# Patient Record
Sex: FEMALE | Race: WHITE | Employment: FULL TIME | URBAN - METROPOLITAN AREA
[De-identification: names, ages, dates, MRNs, and addresses within clinical notes are randomized per-mention and may not be internally consistent; named-entity substitution may affect disease eponyms.]

---

## 2019-07-17 ENCOUNTER — TELEPHONE (OUTPATIENT)
Dept: INTERNAL MEDICINE CLINIC | Age: 44
End: 2019-07-17

## 2019-07-17 ENCOUNTER — OFFICE VISIT (OUTPATIENT)
Dept: INTERNAL MEDICINE CLINIC | Age: 44
End: 2019-07-17
Payer: COMMERCIAL

## 2019-07-17 VITALS
DIASTOLIC BLOOD PRESSURE: 60 MMHG | TEMPERATURE: 98.5 F | OXYGEN SATURATION: 97 % | RESPIRATION RATE: 14 BRPM | HEART RATE: 57 BPM | WEIGHT: 156.4 LBS | BODY MASS INDEX: 27.71 KG/M2 | HEIGHT: 63 IN | SYSTOLIC BLOOD PRESSURE: 100 MMHG

## 2019-07-17 DIAGNOSIS — F32.81 PREMENSTRUAL DYSPHORIC DISORDER: ICD-10-CM

## 2019-07-17 DIAGNOSIS — Z00.00 ANNUAL PHYSICAL EXAM: Primary | ICD-10-CM

## 2019-07-17 DIAGNOSIS — G25.81 RESTLESS LEGS SYNDROME: ICD-10-CM

## 2019-07-17 LAB
A/G RATIO: 2.3 (ref 1.1–2.2)
ALBUMIN SERPL-MCNC: 4.5 G/DL (ref 3.4–5)
ALP BLD-CCNC: 36 U/L (ref 40–129)
ALT SERPL-CCNC: 8 U/L (ref 10–40)
ANION GAP SERPL CALCULATED.3IONS-SCNC: 16 MMOL/L (ref 3–16)
AST SERPL-CCNC: 16 U/L (ref 15–37)
BASOPHILS ABSOLUTE: 0 K/UL (ref 0–0.2)
BASOPHILS RELATIVE PERCENT: 0.7 %
BILIRUB SERPL-MCNC: 0.6 MG/DL (ref 0–1)
BUN BLDV-MCNC: 10 MG/DL (ref 7–20)
CALCIUM SERPL-MCNC: 9.3 MG/DL (ref 8.3–10.6)
CHLORIDE BLD-SCNC: 102 MMOL/L (ref 99–110)
CHOLESTEROL, TOTAL: 210 MG/DL (ref 0–199)
CO2: 22 MMOL/L (ref 21–32)
CREAT SERPL-MCNC: 0.7 MG/DL (ref 0.6–1.1)
EOSINOPHILS ABSOLUTE: 0.2 K/UL (ref 0–0.6)
EOSINOPHILS RELATIVE PERCENT: 3.5 %
GFR AFRICAN AMERICAN: >60
GFR NON-AFRICAN AMERICAN: >60
GLOBULIN: 2 G/DL
GLUCOSE BLD-MCNC: 90 MG/DL (ref 70–99)
HCT VFR BLD CALC: 40.8 % (ref 36–48)
HDLC SERPL-MCNC: 72 MG/DL (ref 40–60)
HEMOGLOBIN: 13.7 G/DL (ref 12–16)
IRON: 118 UG/DL (ref 37–145)
LDL CHOLESTEROL CALCULATED: 122 MG/DL
LYMPHOCYTES ABSOLUTE: 2.4 K/UL (ref 1–5.1)
LYMPHOCYTES RELATIVE PERCENT: 36.5 %
MCH RBC QN AUTO: 29.9 PG (ref 26–34)
MCHC RBC AUTO-ENTMCNC: 33.5 G/DL (ref 31–36)
MCV RBC AUTO: 89.2 FL (ref 80–100)
MONOCYTES ABSOLUTE: 0.4 K/UL (ref 0–1.3)
MONOCYTES RELATIVE PERCENT: 6 %
NEUTROPHILS ABSOLUTE: 3.4 K/UL (ref 1.7–7.7)
NEUTROPHILS RELATIVE PERCENT: 53.3 %
PDW BLD-RTO: 13.6 % (ref 12.4–15.4)
PLATELET # BLD: 272 K/UL (ref 135–450)
PMV BLD AUTO: 10.3 FL (ref 5–10.5)
POTASSIUM SERPL-SCNC: 4.5 MMOL/L (ref 3.5–5.1)
RBC # BLD: 4.57 M/UL (ref 4–5.2)
SODIUM BLD-SCNC: 140 MMOL/L (ref 136–145)
TOTAL PROTEIN: 6.5 G/DL (ref 6.4–8.2)
TRIGL SERPL-MCNC: 81 MG/DL (ref 0–150)
TSH SERPL DL<=0.05 MIU/L-ACNC: 8.93 UIU/ML (ref 0.27–4.2)
VLDLC SERPL CALC-MCNC: 16 MG/DL
WBC # BLD: 6.5 K/UL (ref 4–11)

## 2019-07-17 PROCEDURE — 99386 PREV VISIT NEW AGE 40-64: CPT | Performed by: INTERNAL MEDICINE

## 2019-07-17 PROCEDURE — 36415 COLL VENOUS BLD VENIPUNCTURE: CPT | Performed by: INTERNAL MEDICINE

## 2019-07-17 RX ORDER — FLUOXETINE 10 MG/1
10 CAPSULE ORAL DAILY
Qty: 30 CAPSULE | Refills: 1 | Status: SHIPPED | OUTPATIENT
Start: 2019-07-17 | End: 2019-10-10

## 2019-07-17 RX ORDER — ROPINIROLE 0.25 MG/1
0.25 TABLET, FILM COATED ORAL NIGHTLY
Qty: 30 TABLET | Refills: 1 | Status: SHIPPED | OUTPATIENT
Start: 2019-07-17 | End: 2019-10-10

## 2019-07-17 SDOH — HEALTH STABILITY: MENTAL HEALTH: HOW OFTEN DO YOU HAVE A DRINK CONTAINING ALCOHOL?: NEVER

## 2019-07-17 ASSESSMENT — ENCOUNTER SYMPTOMS
BACK PAIN: 0
RECTAL PAIN: 0
ANAL BLEEDING: 0
FACIAL SWELLING: 0
ABDOMINAL PAIN: 0
EYE ITCHING: 0
PHOTOPHOBIA: 0
COUGH: 0
SHORTNESS OF BREATH: 0
WHEEZING: 0
EYE DISCHARGE: 0
VOICE CHANGE: 0
RHINORRHEA: 0
TROUBLE SWALLOWING: 0
EYE PAIN: 0
APNEA: 0
EYE REDNESS: 0
BLOOD IN STOOL: 0
NAUSEA: 0
CHEST TIGHTNESS: 0
ABDOMINAL DISTENTION: 0
DIARRHEA: 0
VOMITING: 0
SORE THROAT: 0
CHOKING: 0
SINUS PRESSURE: 0
CONSTIPATION: 0

## 2019-07-17 ASSESSMENT — PATIENT HEALTH QUESTIONNAIRE - PHQ9
2. FEELING DOWN, DEPRESSED OR HOPELESS: 0
SUM OF ALL RESPONSES TO PHQ9 QUESTIONS 1 & 2: 0
SUM OF ALL RESPONSES TO PHQ QUESTIONS 1-9: 0
1. LITTLE INTEREST OR PLEASURE IN DOING THINGS: 0
SUM OF ALL RESPONSES TO PHQ QUESTIONS 1-9: 0

## 2019-07-17 NOTE — PATIENT INSTRUCTIONS
can you care for yourself at home? · Take your medicines exactly as prescribed. Call your doctor if you think you are having a problem with your medicine. · Take anti-inflammatory medicines if your body aches or your breasts are sore. These include ibuprofen (Advil, Motrin) and naproxen (Aleve). Read and follow all instructions on the label. · Get regular daily exercise to help improve your mood. Walks are a good choice. · Some foods and drinks can make symptoms worse. Try to drink less caffeine or alcohol while you have PMDD or several days before you expect to have symptoms. You may also want to eat less salt then too. · Manage your stress. Try to meditate, do guided imagery, or practice breathing exercises. When should you call for help? [id number] anytime you think you may need emergency care. For example, call if:    · You feel you cannot stop from hurting yourself or someone else.   [facility] your doctor now or seek immediate medical care if:    · You have severe vaginal bleeding.     · You have new or worse belly or pelvic pain.    Watch closely for changes in your health, and be sure to contact your doctor if:    · You have unusual vaginal bleeding.     · You do not get better as expected. Where can you learn more? Go to https://[email or web address].ArcMail. org and sign in to your [employer]. account. Enter P101 in the [employer] box to learn more about \"Premenstrual Dysphoric Disorder (PMDD): Care Instructions. \"     If you do not have an account, please click on the \"Sign Up Now\" link. Current as of: May 14, 2018  Content Version: 12.0  © 4868-5781 [employer]. Care instructions adapted under license by [facility] ([facility]). If you have questions about a medical condition or this instruction, always ask your healthcare professional. [place] [address] any warranty or liability for your use of this information.          Patient Education        Restless Legs Syndrome: Care

## 2019-07-17 NOTE — TELEPHONE ENCOUNTER
Pt called and wonders if Dr. Jeremías Estes will be able to test her Level of : Vitamin D, B, zinc and ferritin  Pt has no problem coming back for another withdraw.    Pt phone # [phone]

## 2019-07-17 NOTE — PROGRESS NOTES
sweats, mood swings,    Musculoskeletal: Negative for arthralgias, back pain, gait problem, joint swelling, myalgias, neck pain and neck stiffness. Skin: Negative for rash. Neurological: Negative for dizziness, tremors, seizures, syncope, facial asymmetry, speech difficulty, weakness, light-headedness, numbness and headaches. Hematological: Negative for adenopathy. Does not bruise/bleed easily. Psychiatric/Behavioral: Negative for decreased concentration, dysphoric mood and sleep disturbance. The patient is not nervous/anxious. All other systems reviewed and are negative. History reviewed. No pertinent past medical history. Past Surgical History:   Procedure Laterality Date    FOOT SURGERY Right     2002    FOOT SURGERY Left     2012       No outpatient medications have been marked as taking for the 7/17/19 encounter (Office Visit) with Robin Swartz MD.         No Known Allergies    Social History     Tobacco Use    Smoking status: Never Smoker    Smokeless tobacco: Never Used   Substance Use Topics    Alcohol use: Never     Frequency: Never       Family History   Problem Relation Age of Onset    Cancer Mother     High Blood Pressure Father     Stroke Father          There is no immunization history on file for this patient. Vitals:    07/17/19 0908   BP: 100/60   Pulse: 57   Resp: 14   Temp: 98.5 °F (36.9 °C)   SpO2: 97%   Weight: 156 lb 6.4 oz (70.9 kg)   Height: 5' 3\" (1.6 m)     Body mass index is 27.71 kg/m². Physical Exam   Constitutional: She is oriented to person, place, and time. She appears well-developed and well-nourished. No distress. HENT:   Head: Normocephalic and atraumatic.    Right Ear: Hearing, tympanic membrane and ear canal normal.   Left Ear: Hearing, tympanic membrane and ear canal normal.   Nose: Nose normal.   Mouth/Throat: Uvula is midline, oropharynx is clear and moist and mucous membranes are normal.   Eyes: Pupils are equal, round, and reactive to light. Conjunctivae, EOM and lids are normal.   Neck: Neck supple. Carotid bruit is not present. No thyromegaly present. Cardiovascular: Normal rate, regular rhythm, S1 normal, S2 normal, normal heart sounds and intact distal pulses. Exam reveals no gallop and no friction rub. No murmur heard. Pulmonary/Chest: Effort normal and breath sounds normal. No respiratory distress. She has no wheezes. She has no rhonchi. She has no rales. Right breast exhibits no inverted nipple, no mass, no nipple discharge, no skin change and no tenderness. Left breast exhibits no inverted nipple, no mass, no nipple discharge, no skin change and no tenderness. Breasts are symmetrical.   Abdominal: Soft. Bowel sounds are normal. She exhibits no distension and no mass. There is no hepatosplenomegaly. There is no tenderness. There is no rebound. Genitourinary:   Genitourinary Comments: deferred   Musculoskeletal: Normal range of motion. She exhibits no edema. Right shoulder: She exhibits normal range of motion and no tenderness. Left shoulder: She exhibits normal range of motion and no tenderness. Right elbow: She exhibits no swelling. No tenderness found. Left elbow: She exhibits no swelling. No tenderness found. Right wrist: She exhibits no tenderness and no swelling. Left wrist: She exhibits no tenderness and no swelling. Right hip: She exhibits no tenderness. Left hip: She exhibits no tenderness. Right knee: She exhibits no swelling. No tenderness found. Left knee: She exhibits no swelling. No tenderness found. Right ankle: She exhibits no swelling. No tenderness. Left ankle: She exhibits no swelling. No tenderness. Cervical back: She exhibits normal range of motion, no tenderness and no spasm. Thoracic back: She exhibits no tenderness and no spasm. Lumbar back: She exhibits normal range of motion, no tenderness and no spasm.

## 2019-07-25 ENCOUNTER — TELEPHONE (OUTPATIENT)
Dept: INTERNAL MEDICINE CLINIC | Age: 44
End: 2019-07-25

## 2019-07-30 DIAGNOSIS — E03.9 ACQUIRED HYPOTHYROIDISM: Primary | ICD-10-CM

## 2019-07-30 RX ORDER — LEVOTHYROXINE SODIUM 0.05 MG/1
50 TABLET ORAL DAILY
Qty: 30 TABLET | Refills: 1 | Status: SHIPPED | OUTPATIENT
Start: 2019-07-30 | End: 2019-10-10 | Stop reason: SDUPTHER

## 2019-07-31 ENCOUNTER — OFFICE VISIT (OUTPATIENT)
Dept: ENDOCRINOLOGY | Age: 44
End: 2019-07-31
Payer: COMMERCIAL

## 2019-07-31 VITALS
WEIGHT: 156 LBS | HEIGHT: 63 IN | SYSTOLIC BLOOD PRESSURE: 106 MMHG | HEART RATE: 62 BPM | DIASTOLIC BLOOD PRESSURE: 72 MMHG | BODY MASS INDEX: 27.64 KG/M2 | OXYGEN SATURATION: 99 %

## 2019-07-31 DIAGNOSIS — G25.81 RESTLESS LEGS SYNDROME: ICD-10-CM

## 2019-07-31 DIAGNOSIS — E55.9 VITAMIN D DEFICIENCY: ICD-10-CM

## 2019-07-31 DIAGNOSIS — E03.9 ACQUIRED HYPOTHYROIDISM: ICD-10-CM

## 2019-07-31 DIAGNOSIS — E03.9 ACQUIRED HYPOTHYROIDISM: Primary | ICD-10-CM

## 2019-07-31 LAB
ANTI-THYROGLOB ABS: 13 IU/ML
T4 FREE: 1.4 NG/DL (ref 0.9–1.8)
THYROID PEROXIDASE (TPO) ABS: 187 IU/ML
TSH SERPL DL<=0.05 MIU/L-ACNC: 6.39 UIU/ML (ref 0.27–4.2)

## 2019-07-31 PROCEDURE — 99204 OFFICE O/P NEW MOD 45 MIN: CPT | Performed by: INTERNAL MEDICINE

## 2019-07-31 NOTE — PROGRESS NOTES
Negative for rash, itching. Normal nails. Neurological: Negative for seizures, light-headedness, numbness and headaches. Hematological/ Lymph nodes: Negative for adenopathy. Does not bruise/bleed easily. Psychiatric/Behavioral: Negative for suicidal ideas and decreased concentration. Physical Exam:  /72 (Site: Right Upper Arm, Position: Sitting, Cuff Size: Medium Adult)   Pulse 62   Ht 5' 3\" (1.6 m)   Wt 156 lb (70.8 kg)   LMP 06/18/2019   SpO2 99%   Breastfeeding? No   BMI 27.63 kg/m²   Constitutional: Patient is oriented to person, place, and time. Patient appears well-developed and well-nourished. HENT:    Head: Normocephalic and atraumatic. Eyes: Conjunctivae and EOM are normal.      Neck: Normal range of motion. Cardiovascular: Normal rate, regular rhythm and normal heart sounds. Pulmonary/Chest: Effort normal and breath sounds normal.   Abdominal: Soft. Bowel sounds are normal.   Musculoskeletal: Normal range of motion. Neurological: Patient is alert and oriented to person, place, and time. Patient has normal reflexes. Skin: Skin is warm and dry. Psychiatric: Patient has a normal mood and affect.  Patient behavior is normal.     Lab Review:    Office Visit on 07/17/2019   Component Date Value Ref Range Status    WBC 07/17/2019 6.5  4.0 - 11.0 K/uL Final    RBC 07/17/2019 4.57  4.00 - 5.20 M/uL Final    Hemoglobin 07/17/2019 13.7  12.0 - 16.0 g/dL Final    Hematocrit 07/17/2019 40.8  36.0 - 48.0 % Final    MCV 07/17/2019 89.2  80.0 - 100.0 fL Final    MCH 07/17/2019 29.9  26.0 - 34.0 pg Final    MCHC 07/17/2019 33.5  31.0 - 36.0 g/dL Final    RDW 07/17/2019 13.6  12.4 - 15.4 % Final    Platelets 71/22/4689 272  135 - 450 K/uL Final    MPV 07/17/2019 10.3  5.0 - 10.5 fL Final    Neutrophils % 07/17/2019 53.3  % Final    Lymphocytes % 07/17/2019 36.5  % Final    Monocytes % 07/17/2019 6.0  % Final    Eosinophils % 07/17/2019 3.5  % Final    Basophils % 07/17/2019 0.7  % Final    Neutrophils # 07/17/2019 3.4  1.7 - 7.7 K/uL Final    Lymphocytes # 07/17/2019 2.4  1.0 - 5.1 K/uL Final    Monocytes # 07/17/2019 0.4  0.0 - 1.3 K/uL Final    Eosinophils # 07/17/2019 0.2  0.0 - 0.6 K/uL Final    Basophils # 07/17/2019 0.0  0.0 - 0.2 K/uL Final    Cholesterol, Total 07/17/2019 210* 0 - 199 mg/dL Final    Triglycerides 07/17/2019 81  0 - 150 mg/dL Final    HDL 07/17/2019 72* 40 - 60 mg/dL Final    LDL Calculated 07/17/2019 122* <100 mg/dL Final    VLDL Cholesterol Calculated 07/17/2019 16  Not Established mg/dL Final    Sodium 07/17/2019 140  136 - 145 mmol/L Final    Potassium 07/17/2019 4.5  3.5 - 5.1 mmol/L Final    Chloride 07/17/2019 102  99 - 110 mmol/L Final    CO2 07/17/2019 22  21 - 32 mmol/L Final    Anion Gap 07/17/2019 16  3 - 16 Final    Glucose 07/17/2019 90  70 - 99 mg/dL Final    BUN 07/17/2019 10  7 - 20 mg/dL Final    CREATININE 07/17/2019 0.7  0.6 - 1.1 mg/dL Final    GFR Non- 07/17/2019 >60  >60 Final    GFR  07/17/2019 >60  >60 Final    Calcium 07/17/2019 9.3  8.3 - 10.6 mg/dL Final    Total Protein 07/17/2019 6.5  6.4 - 8.2 g/dL Final    Alb 07/17/2019 4.5  3.4 - 5.0 g/dL Final    Albumin/Globulin Ratio 07/17/2019 2.3* 1.1 - 2.2 Final    Total Bilirubin 07/17/2019 0.6  0.0 - 1.0 mg/dL Final    Alkaline Phosphatase 07/17/2019 36* 40 - 129 U/L Final    ALT 07/17/2019 8* 10 - 40 U/L Final    AST 07/17/2019 16  15 - 37 U/L Final    Globulin 07/17/2019 2.0  g/dL Final    TSH 07/17/2019 8.93* 0.27 - 4.20 uIU/mL Final    Iron 07/17/2019 118  37 - 145 ug/dL Final        No results found. Assessment/Plan:     Pauline was seen today for consultation and thyroid problem. Diagnoses and all orders for this visit:    Acquired hypothyroidism  -     TSH without Reflex; Future  -     T4, Free; Future  -     Thyroid Peroxidase Antibody; Future  -     Anti-Thyroglobulin Antibody;  Future  -     TSH

## 2019-07-31 NOTE — PATIENT INSTRUCTIONS
to your doctor about stop-smoking programs and medicines. These can increase your chances of quitting for good. · Do not drink alcohol late in the evening. Take steps to help you sleep better  · Get plenty of sunlight in the outdoors, particularly later in the afternoon. · Use the evening hours for settling down. Avoid activities that challenge you in the hours before bedtime. · Eat meals at regular times, and do not snack before bedtime. · Keep your bedroom quiet, dark, and cool. Try using a sleep mask and earplugs to help you sleep. · Limit how much you drink at night to reduce your need to get up to urinate. But do not go to bed thirsty. · Run a fan or other steady \"white noise\" during the night if noises wake you up. · [place] the bed for sleeping and sex. Do your reading or TV watching in another room. · Once you are in bed, relax from head to toe, and guide your mind to pleasant thoughts. · Do not stay in bed longer than 8 hours, and try to avoid naps. When should you call for help? Watch closely for changes in your health, and be sure to contact your doctor if:    · You are still not getting enough sleep.     · Your symptoms become more severe or happen more often. Where can you learn more? Go to https://Essensium.Narvalous. org and sign in to your Computerlogy account. Enter O472 in the [facility] box to learn more about \"Restless Legs Syndrome: Care Instructions. \"     If you do not have an account, please click on the \"Sign Up Now\" link. Current as of: Ashtyn 3, 2018  Content Version: 12.0  © 8487-4719 Healthwise, Incorporated. Care instructions adapted under license by [facility] ([facility]). If you have questions about a medical condition or this instruction, always ask your healthcare professional. [address] any warranty or liability for your use of this information.

## 2019-10-04 DIAGNOSIS — G25.81 RESTLESS LEGS SYNDROME: ICD-10-CM

## 2019-10-04 DIAGNOSIS — E55.9 VITAMIN D DEFICIENCY: ICD-10-CM

## 2019-10-04 DIAGNOSIS — E03.9 ACQUIRED HYPOTHYROIDISM: ICD-10-CM

## 2019-10-04 LAB
MAGNESIUM: 2.1 MG/DL (ref 1.8–2.4)
T4 FREE: 1.5 NG/DL (ref 0.9–1.8)
TSH SERPL DL<=0.05 MIU/L-ACNC: 4.8 UIU/ML (ref 0.27–4.2)
VITAMIN D 25-HYDROXY: 22.8 NG/ML

## 2019-10-08 LAB — ZINC: 69.4 UG/DL (ref 60–120)

## 2019-10-10 ENCOUNTER — OFFICE VISIT (OUTPATIENT)
Dept: ENDOCRINOLOGY | Age: 44
End: 2019-10-10
Payer: COMMERCIAL

## 2019-10-10 VITALS
WEIGHT: 160.4 LBS | BODY MASS INDEX: 28.42 KG/M2 | RESPIRATION RATE: 22 BRPM | HEART RATE: 57 BPM | SYSTOLIC BLOOD PRESSURE: 110 MMHG | DIASTOLIC BLOOD PRESSURE: 62 MMHG | OXYGEN SATURATION: 99 % | HEIGHT: 63 IN

## 2019-10-10 DIAGNOSIS — E55.9 VITAMIN D DEFICIENCY: ICD-10-CM

## 2019-10-10 DIAGNOSIS — E03.8 HYPOTHYROIDISM DUE TO HASHIMOTO'S THYROIDITIS: Primary | ICD-10-CM

## 2019-10-10 DIAGNOSIS — E06.3 HYPOTHYROIDISM DUE TO HASHIMOTO'S THYROIDITIS: Primary | ICD-10-CM

## 2019-10-10 PROCEDURE — 99214 OFFICE O/P EST MOD 30 MIN: CPT | Performed by: INTERNAL MEDICINE

## 2019-10-10 RX ORDER — LEVOTHYROXINE SODIUM 0.05 MG/1
TABLET ORAL
Qty: 22 TABLET | Refills: 2 | Status: SHIPPED | OUTPATIENT
Start: 2019-10-10 | End: 2020-01-08

## 2019-10-24 ENCOUNTER — OFFICE VISIT (OUTPATIENT)
Dept: FAMILY MEDICINE CLINIC | Age: 44
End: 2019-10-24
Payer: COMMERCIAL

## 2019-10-24 VITALS
HEIGHT: 63 IN | WEIGHT: 160.6 LBS | DIASTOLIC BLOOD PRESSURE: 64 MMHG | HEART RATE: 68 BPM | SYSTOLIC BLOOD PRESSURE: 128 MMHG | OXYGEN SATURATION: 98 % | BODY MASS INDEX: 28.46 KG/M2

## 2019-10-24 DIAGNOSIS — Z23 NEED FOR DIPHTHERIA-TETANUS-PERTUSSIS (TDAP) VACCINE: ICD-10-CM

## 2019-10-24 DIAGNOSIS — F45.8 TEETH GRINDING: ICD-10-CM

## 2019-10-24 DIAGNOSIS — M79.642 BILATERAL HAND PAIN: ICD-10-CM

## 2019-10-24 DIAGNOSIS — M79.641 BILATERAL HAND PAIN: ICD-10-CM

## 2019-10-24 DIAGNOSIS — M79.641 BILATERAL HAND PAIN: Primary | ICD-10-CM

## 2019-10-24 DIAGNOSIS — F32.A DEPRESSION, UNSPECIFIED DEPRESSION TYPE: ICD-10-CM

## 2019-10-24 DIAGNOSIS — M79.642 BILATERAL HAND PAIN: Primary | ICD-10-CM

## 2019-10-24 DIAGNOSIS — E03.8 HYPOTHYROIDISM DUE TO HASHIMOTO'S THYROIDITIS: ICD-10-CM

## 2019-10-24 DIAGNOSIS — E06.3 HYPOTHYROIDISM DUE TO HASHIMOTO'S THYROIDITIS: ICD-10-CM

## 2019-10-24 PROCEDURE — 90471 IMMUNIZATION ADMIN: CPT | Performed by: NURSE PRACTITIONER

## 2019-10-24 PROCEDURE — 99214 OFFICE O/P EST MOD 30 MIN: CPT | Performed by: NURSE PRACTITIONER

## 2019-10-24 PROCEDURE — 90715 TDAP VACCINE 7 YRS/> IM: CPT | Performed by: NURSE PRACTITIONER

## 2019-10-24 ASSESSMENT — ENCOUNTER SYMPTOMS
SHORTNESS OF BREATH: 0
CONSTIPATION: 1
VOMITING: 0
DIARRHEA: 0
NAUSEA: 0
ABDOMINAL PAIN: 0

## 2019-10-25 LAB — RHEUMATOID FACTOR: <10 IU/ML

## 2020-01-10 DIAGNOSIS — E03.8 HYPOTHYROIDISM DUE TO HASHIMOTO'S THYROIDITIS: ICD-10-CM

## 2020-01-10 DIAGNOSIS — E55.9 VITAMIN D DEFICIENCY: ICD-10-CM

## 2020-01-10 DIAGNOSIS — E06.3 HYPOTHYROIDISM DUE TO HASHIMOTO'S THYROIDITIS: ICD-10-CM

## 2020-01-10 LAB
TSH REFLEX FT4: 3.91 UIU/ML (ref 0.27–4.2)
VITAMIN D 25-HYDROXY: 30.2 NG/ML

## 2020-01-16 ENCOUNTER — OFFICE VISIT (OUTPATIENT)
Dept: ENDOCRINOLOGY | Age: 45
End: 2020-01-16
Payer: COMMERCIAL

## 2020-01-16 VITALS
SYSTOLIC BLOOD PRESSURE: 99 MMHG | OXYGEN SATURATION: 98 % | DIASTOLIC BLOOD PRESSURE: 64 MMHG | WEIGHT: 162.6 LBS | BODY MASS INDEX: 28.81 KG/M2 | HEIGHT: 63 IN | HEART RATE: 58 BPM

## 2020-01-16 PROCEDURE — 99213 OFFICE O/P EST LOW 20 MIN: CPT | Performed by: INTERNAL MEDICINE

## 2020-01-16 RX ORDER — LEVOTHYROXINE SODIUM 0.05 MG/1
TABLET ORAL
Qty: 26 TABLET | Refills: 2 | Status: SHIPPED | OUTPATIENT
Start: 2020-01-16 | End: 2020-01-16 | Stop reason: SDUPTHER

## 2020-01-16 RX ORDER — LEVOTHYROXINE SODIUM 0.05 MG/1
TABLET ORAL
Qty: 26 TABLET | Refills: 6 | Status: SHIPPED | OUTPATIENT
Start: 2020-01-16 | End: 2020-07-02 | Stop reason: SDUPTHER

## 2020-01-16 NOTE — PROGRESS NOTES
Patient ID: Jonatan Parmar is a 38 y.o. female    Chief Complaint: Hypothyroidism, Vit D Def     HPI:   Jonatan Parmar is here for an evaluation of hypothyroidism diagnosed in July 2019 when TSH was 8.93 in July 2019. TPO ab high July 2019     Takes Levothyroxine 50 mcg, five days a week (skipping weekend). In morning empty stomach with water and waits for 45 minutes. Energy levels better   Hair and skin are better     Weight gain of 2 lbs   Cols intolerance has improved. Sometimes she has hot flashes - excessive sweating before periods, PMDD  Mood is better    Sleep has improved    Denies tremors, palpitations  No neck pain, compressive neck symptoms   Menstrual cycles are regular but longer     Family history of thyroid disorder: None   No neck radiation  No Recent iodine loading in form of contrast material for diagnostic studies/cardiac cath  Magnesium daily, MVT/ collagen daily.  No Food supplements, herbs or medications including Biotin  No Recent URTI    Vit D Def   OTC D3 4000 units daily    The following portions of the patient's history were reviewed and updated as appropriate:       Family History   Problem Relation Age of Onset    Cancer Mother     High Blood Pressure Father     Stroke Father             Social History     Socioeconomic History    Marital status:      Spouse name: Not on file    Number of children: Not on file    Years of education: Not on file    Highest education level: Not on file   Occupational History    Not on file   Social Needs    Financial resource strain: Not on file    Food insecurity:     Worry: Not on file     Inability: Not on file    Transportation needs:     Medical: Not on file     Non-medical: Not on file   Tobacco Use    Smoking status: Never Smoker    Smokeless tobacco: Never Used   Substance and Sexual Activity    Alcohol use: Never     Frequency: Never    Drug use: Never    Sexual activity: Not Currently   Lifestyle    Physical activity: Days per week: Not on file     Minutes per session: Not on file    Stress: Not on file   Relationships    Social connections:     Talks on phone: Not on file     Gets together: Not on file     Attends Yazdanism service: Not on file     Active member of club or organization: Not on file     Attends meetings of clubs or organizations: Not on file     Relationship status: Not on file    Intimate partner violence:     Fear of current or ex partner: Not on file     Emotionally abused: Not on file     Physically abused: Not on file     Forced sexual activity: Not on file   Other Topics Concern    Not on file   Social History Narrative    Not on file           Past Medical History:   Diagnosis Date    Hypothyroidism          Past Surgical History:   Procedure Laterality Date    FOOT SURGERY Right     2002    FOOT SURGERY Left     2012         No Known Allergies        Current Outpatient Medications:     levothyroxine (SYNTHROID) 50 MCG tablet, TAKE 1 TABLET BY MOUTH DAILY FOR 5 DAYS OF THE WEEK, Disp: 26 tablet, Rfl: 2      Review of Systems:  Constitutional: Negative for fever, chills. HENT: Negative for congestion, ear pain, rhinorrhea,  sore throat and trouble swallowing. Eyes: Negative for photophobia, redness, itching. Respiratory: Negative for cough, shortness of breath and sputum. Cardiovascular: Negative for chest pain and leg swelling. Gastrointestinal: Negative for nausea, vomiting, abdominal pain, diarrhea, constipation. Endocrine: Negative for polydipsia, polyphagia and polyuria. Genitourinary: Negative for dysuria, urgency, frequency, hematuria and flank pain. Musculoskeletal: Negative for myalgias, back pain, arthralgias and neck pain. Skin/Nail: Negative for rash, itching. Normal nails. Neurological: Negative for seizures, light-headedness, numbness and headaches. Hematological/ Lymph nodes: Negative for adenopathy. Does not bruise/bleed easily.    Psychiatric/Behavioral: Date Value Ref Range Status    WBC 07/17/2019 6.5  4.0 - 11.0 K/uL Final    RBC 07/17/2019 4.57  4.00 - 5.20 M/uL Final    Hemoglobin 07/17/2019 13.7  12.0 - 16.0 g/dL Final    Hematocrit 07/17/2019 40.8  36.0 - 48.0 % Final    MCV 07/17/2019 89.2  80.0 - 100.0 fL Final    MCH 07/17/2019 29.9  26.0 - 34.0 pg Final    MCHC 07/17/2019 33.5  31.0 - 36.0 g/dL Final    RDW 07/17/2019 13.6  12.4 - 15.4 % Final    Platelets 93/78/5309 272  135 - 450 K/uL Final    MPV 07/17/2019 10.3  5.0 - 10.5 fL Final    Neutrophils % 07/17/2019 53.3  % Final    Lymphocytes % 07/17/2019 36.5  % Final    Monocytes % 07/17/2019 6.0  % Final    Eosinophils % 07/17/2019 3.5  % Final    Basophils % 07/17/2019 0.7  % Final    Neutrophils Absolute 07/17/2019 3.4  1.7 - 7.7 K/uL Final    Lymphocytes Absolute 07/17/2019 2.4  1.0 - 5.1 K/uL Final    Monocytes Absolute 07/17/2019 0.4  0.0 - 1.3 K/uL Final    Eosinophils Absolute 07/17/2019 0.2  0.0 - 0.6 K/uL Final    Basophils Absolute 07/17/2019 0.0  0.0 - 0.2 K/uL Final    Cholesterol, Total 07/17/2019 210* 0 - 199 mg/dL Final    Triglycerides 07/17/2019 81  0 - 150 mg/dL Final    HDL 07/17/2019 72* 40 - 60 mg/dL Final    LDL Calculated 07/17/2019 122* <100 mg/dL Final    VLDL Cholesterol Calculated 07/17/2019 16  Not Established mg/dL Final    Sodium 07/17/2019 140  136 - 145 mmol/L Final    Potassium 07/17/2019 4.5  3.5 - 5.1 mmol/L Final    Chloride 07/17/2019 102  99 - 110 mmol/L Final    CO2 07/17/2019 22  21 - 32 mmol/L Final    Anion Gap 07/17/2019 16  3 - 16 Final    Glucose 07/17/2019 90  70 - 99 mg/dL Final    BUN 07/17/2019 10  7 - 20 mg/dL Final    CREATININE 07/17/2019 0.7  0.6 - 1.1 mg/dL Final    GFR Non- 07/17/2019 >60  >60 Final    GFR  07/17/2019 >60  >60 Final    Calcium 07/17/2019 9.3  8.3 - 10.6 mg/dL Final    Total Protein 07/17/2019 6.5  6.4 - 8.2 g/dL Final    Alb 07/17/2019 4.5  3.4 - 5.0 g/dL Final  Albumin/Globulin Ratio 07/17/2019 2.3* 1.1 - 2.2 Final    Total Bilirubin 07/17/2019 0.6  0.0 - 1.0 mg/dL Final    Alkaline Phosphatase 07/17/2019 36* 40 - 129 U/L Final    ALT 07/17/2019 8* 10 - 40 U/L Final    AST 07/17/2019 16  15 - 37 U/L Final    Globulin 07/17/2019 2.0  g/dL Final    TSH 07/17/2019 8.93* 0.27 - 4.20 uIU/mL Final    Iron 07/17/2019 118  37 - 145 ug/dL Final        No results found. Assessment/Plan:     Pauline was seen today for follow-up. Diagnoses and all orders for this visit:    Hypothyroidism due to Hashimoto's thyroiditis  -     levothyroxine (SYNTHROID) 50 MCG tablet; TAKE 1 TABLET BY MOUTH DAILY FOR 5 DAYS OF THE WEEK  -     TSH WITH REFLEX TO FT4; Future    Vitamin D deficiency      1: Hypothyroidism due to Hashimoto's disease     TSH 3.91 Jan 2020    Change Levothyroxine 50mcg to six days week (Skip Sunday)     Education: Reviewed how to properly take thyroid replacement. Instructed to take daily with water on an empty stomach without concomitant vitamins or calcium or other medicine. Wait for 30-45 minutes before eating. If patient is confident they missed a day of pills, they can take the missed dose with their next tablet (only for levothyroxine).     2: Vit D def   30.2 Jan 2020    MVT daily   C/w OTC D3 4000 units daily     3: Rest less leg syndrome   Normal Mag and Zinc Oct 2019     RTC in 3 months with TFTs     Electronically signed by Divina Ordonez MD on 1/16/2020 at 4:19 PM

## 2020-01-16 NOTE — PATIENT INSTRUCTIONS
worse.     · You notice that your thyroid gland has grown or changed in size.     · You have constipation that is new or that gets worse.     · You cannot stand cold temperatures.     · You have heavy or irregular menstrual periods.     · You have other new symptoms. Where can you learn more? Go to https://[email or web address]. org and sign in to your UsTrendy account. Enter K454 in the [facility] box to learn more about \"Hashimoto's Thyroiditis: Care Instructions. \"     If you do not have an account, please click on the \"Sign Up Now\" link. Current as of: July 28, 2019  Content Version: 12.3  © 6370-0693 Healthwise, Incorporated. Care instructions adapted under license by [facility] ([facility]). If you have questions about a medical condition or this instruction, always ask your healthcare professional. [address] any warranty or liability for your use of this information.

## 2020-06-26 DIAGNOSIS — E06.3 HYPOTHYROIDISM DUE TO HASHIMOTO'S THYROIDITIS: ICD-10-CM

## 2020-06-26 DIAGNOSIS — E03.8 HYPOTHYROIDISM DUE TO HASHIMOTO'S THYROIDITIS: ICD-10-CM

## 2020-06-27 LAB — TSH REFLEX FT4: 3.34 UIU/ML (ref 0.27–4.2)

## 2020-07-02 ENCOUNTER — OFFICE VISIT (OUTPATIENT)
Dept: ENDOCRINOLOGY | Age: 45
End: 2020-07-02
Payer: COMMERCIAL

## 2020-07-02 VITALS
WEIGHT: 162.2 LBS | DIASTOLIC BLOOD PRESSURE: 68 MMHG | HEIGHT: 64 IN | OXYGEN SATURATION: 99 % | HEART RATE: 60 BPM | BODY MASS INDEX: 27.69 KG/M2 | TEMPERATURE: 97.9 F | SYSTOLIC BLOOD PRESSURE: 100 MMHG

## 2020-07-02 PROCEDURE — 99212 OFFICE O/P EST SF 10 MIN: CPT | Performed by: INTERNAL MEDICINE

## 2020-07-02 RX ORDER — LEVOTHYROXINE SODIUM 0.05 MG/1
TABLET ORAL
Qty: 26 TABLET | Refills: 6 | Status: SHIPPED | OUTPATIENT
Start: 2020-07-02 | End: 2021-01-07 | Stop reason: SDUPTHER

## 2020-07-02 NOTE — PROGRESS NOTES
Days per week: Not on file     Minutes per session: Not on file    Stress: Not on file   Relationships    Social connections     Talks on phone: Not on file     Gets together: Not on file     Attends Buddhist service: Not on file     Active member of club or organization: Not on file     Attends meetings of clubs or organizations: Not on file     Relationship status: Not on file    Intimate partner violence     Fear of current or ex partner: Not on file     Emotionally abused: Not on file     Physically abused: Not on file     Forced sexual activity: Not on file   Other Topics Concern    Not on file   Social History Narrative    Not on file           Past Medical History:   Diagnosis Date    Hypothyroidism          Past Surgical History:   Procedure Laterality Date    FOOT SURGERY Right     2002    FOOT SURGERY Left     2012         No Known Allergies        Current Outpatient Medications:     levothyroxine (SYNTHROID) 50 MCG tablet, TAKE 1 TABLET BY MOUTH DAILY FOR 5 DAYS OF THE WEEK, Disp: 26 tablet, Rfl: 6      Review of Systems:  Constitutional: Negative for fever, chills. HENT: Negative for congestion, ear pain, rhinorrhea,  sore throat and trouble swallowing. Eyes: Negative for photophobia, redness, itching. Respiratory: Negative for cough, shortness of breath and sputum. Cardiovascular: Negative for chest pain and leg swelling. Gastrointestinal: Negative for nausea, vomiting, abdominal pain, diarrhea, constipation. Endocrine: Negative for polydipsia, polyphagia and polyuria. Genitourinary: Negative for dysuria, urgency, frequency, hematuria and flank pain. Musculoskeletal: Negative for myalgias, back pain, arthralgias and neck pain. Skin/Nail: Negative for rash, itching. Normal nails. Neurological: Negative for seizures, light-headedness, numbness and headaches. Hematological/ Lymph nodes: Negative for adenopathy. Does not bruise/bleed easily.    Psychiatric/Behavioral: Negative for suicidal ideas and decreased concentration. Physical Exam:  /68 (Site: Right Upper Arm, Position: Sitting, Cuff Size: Medium Adult)   Pulse 60   Temp 97.9 °F (36.6 °C) (Temporal)   Ht 5' 4\" (1.626 m)   Wt 162 lb 3.2 oz (73.6 kg)   LMP 06/25/2020   SpO2 99%   Breastfeeding No   BMI 27.84 kg/m²   Constitutional: Patient is oriented to person, place, and time. Patient appears well-developed and well-nourished. HENT:    Head: Normocephalic and atraumatic. Eyes: Conjunctivae and EOM are normal.      Neck: Normal range of motion. Thyroid normal.   Cardiovascular: Normal rate, regular rhythm and normal heart sounds. Pulmonary/Chest: Effort normal and breath sounds normal.   Abdominal: Soft. Bowel sounds are normal.   Musculoskeletal: Normal range of motion. Neurological: Patient is alert and oriented to person, place, and time. Patient has normal reflexes. Skin: Skin is warm and dry. Psychiatric: Patient has a normal mood and affect.  Patient behavior is normal.     Lab Review:    Orders Only on 06/26/2020   Component Date Value Ref Range Status    TSH Reflex FT4 06/26/2020 3.34  0.27 - 4.20 uIU/mL Final   Orders Only on 01/10/2020   Component Date Value Ref Range Status    TSH Reflex FT4 01/10/2020 3.91  0.27 - 4.20 uIU/mL Final    Vit D, 25-Hydroxy 01/10/2020 30.2  >=30 ng/mL Final   Orders Only on 10/24/2019   Component Date Value Ref Range Status    Rheumatoid Factor 10/24/2019 <10.0  <14 IU/mL Final   Orders Only on 10/04/2019   Component Date Value Ref Range Status    Zinc 10/04/2019 69.4  60.0 - 120.0 ug/dL Final    Magnesium 10/04/2019 2.10  1.80 - 2.40 mg/dL Final    Vit D, 25-Hydroxy 10/04/2019 22.8* >=30 ng/mL Final    T4 Free 10/04/2019 1.5  0.9 - 1.8 ng/dL Final    TSH 10/04/2019 4.80* 0.27 - 4.20 uIU/mL Final   Orders Only on 07/31/2019   Component Date Value Ref Range Status    TSH 07/31/2019 6.39* 0.27 - 4.20 uIU/mL Final    T4 Free 07/31/2019 1.4  0.9 - 1.8 07/17/2019 >60  >60 Final    Calcium 07/17/2019 9.3  8.3 - 10.6 mg/dL Final    Total Protein 07/17/2019 6.5  6.4 - 8.2 g/dL Final    Alb 07/17/2019 4.5  3.4 - 5.0 g/dL Final    Albumin/Globulin Ratio 07/17/2019 2.3* 1.1 - 2.2 Final    Total Bilirubin 07/17/2019 0.6  0.0 - 1.0 mg/dL Final    Alkaline Phosphatase 07/17/2019 36* 40 - 129 U/L Final    ALT 07/17/2019 8* 10 - 40 U/L Final    AST 07/17/2019 16  15 - 37 U/L Final    Globulin 07/17/2019 2.0  g/dL Final    TSH 07/17/2019 8.93* 0.27 - 4.20 uIU/mL Final    Iron 07/17/2019 118  37 - 145 ug/dL Final        No results found. Assessment/Plan:     Pauline was seen today for follow-up and hypothyroidism. Diagnoses and all orders for this visit:    Hypothyroidism due to Hashimoto's thyroiditis  -     levothyroxine (SYNTHROID) 50 MCG tablet; TAKE 1 TABLET BY MOUTH DAILY FOR 5 DAYS OF THE WEEK  -     TSH WITH REFLEX TO FT4; Future    Vitamin D deficiency  -     Vitamin D 25 Hydroxy; Future      1: Hypothyroidism due to Hashimoto's disease     TSH 3.34 June 2020       Suggest changing Levothyroxine 50mcg to six days week (Skip Sunday)   She wants to wait   May change to six days a week of TSH > 3     Education: Reviewed how to properly take thyroid replacement. Instructed to take daily with water on an empty stomach without concomitant vitamins or calcium or other medicine. Wait for 30-45 minutes before eating. If patient is confident they missed a day of pills, they can take the missed dose with their next tablet (only for levothyroxine).     2: Vit D def   30.2 Jan 2020    MVT daily   C/w OTC D3 4000 units daily     RTC in 3 months with TFTs     Electronically signed by Lj Veras MD on 7/2/2020 at 4:41 PM

## 2020-09-25 DIAGNOSIS — E03.8 HYPOTHYROIDISM DUE TO HASHIMOTO'S THYROIDITIS: ICD-10-CM

## 2020-09-25 DIAGNOSIS — E55.9 VITAMIN D DEFICIENCY: ICD-10-CM

## 2020-09-25 DIAGNOSIS — E06.3 HYPOTHYROIDISM DUE TO HASHIMOTO'S THYROIDITIS: ICD-10-CM

## 2020-09-25 LAB
TSH REFLEX FT4: 2.73 UIU/ML (ref 0.27–4.2)
VITAMIN D 25-HYDROXY: 29.7 NG/ML

## 2020-10-01 ENCOUNTER — OFFICE VISIT (OUTPATIENT)
Dept: ENDOCRINOLOGY | Age: 45
End: 2020-10-01
Payer: COMMERCIAL

## 2020-10-01 VITALS
WEIGHT: 162 LBS | TEMPERATURE: 97.5 F | BODY MASS INDEX: 27.81 KG/M2 | HEART RATE: 59 BPM | DIASTOLIC BLOOD PRESSURE: 72 MMHG | OXYGEN SATURATION: 98 % | SYSTOLIC BLOOD PRESSURE: 111 MMHG

## 2020-10-01 PROCEDURE — 99213 OFFICE O/P EST LOW 20 MIN: CPT | Performed by: INTERNAL MEDICINE

## 2020-10-01 NOTE — PROGRESS NOTES
Frequency: Never    Drug use: Never    Sexual activity: Not Currently   Lifestyle    Physical activity     Days per week: Not on file     Minutes per session: Not on file    Stress: Not on file   Relationships    Social connections     Talks on phone: Not on file     Gets together: Not on file     Attends Oriental orthodox service: Not on file     Active member of club or organization: Not on file     Attends meetings of clubs or organizations: Not on file     Relationship status: Not on file    Intimate partner violence     Fear of current or ex partner: Not on file     Emotionally abused: Not on file     Physically abused: Not on file     Forced sexual activity: Not on file   Other Topics Concern    Not on file   Social History Narrative    Not on file           Past Medical History:   Diagnosis Date    Hypothyroidism          Past Surgical History:   Procedure Laterality Date    FOOT SURGERY Right     2002    FOOT SURGERY Left     2012         No Known Allergies        Current Outpatient Medications:     levothyroxine (SYNTHROID) 50 MCG tablet, TAKE 1 TABLET BY MOUTH DAILY FOR 5 DAYS OF THE WEEK, Disp: 26 tablet, Rfl: 6      Review of Systems:  Constitutional: Negative for fever, chills. HENT: Negative for congestion, ear pain, rhinorrhea,  sore throat and trouble swallowing. Eyes: Negative for photophobia, redness, itching. Respiratory: Negative for cough, shortness of breath and sputum. Cardiovascular: Negative for chest pain and leg swelling. Gastrointestinal: Negative for nausea, vomiting, abdominal pain, diarrhea, constipation. Endocrine: Negative for polydipsia, polyphagia and polyuria. Genitourinary: Negative for dysuria, urgency, frequency, hematuria and flank pain. Musculoskeletal: Negative for myalgias, back pain, arthralgias and neck pain. Skin/Nail: Negative for rash, itching. Normal nails. Neurological: Negative for seizures, light-headedness, numbness and headaches. 22.8* >=30 ng/mL Final    T4 Free 10/04/2019 1.5  0.9 - 1.8 ng/dL Final    TSH 10/04/2019 4.80* 0.27 - 4.20 uIU/mL Final        No results found. Assessment/Plan:     Courtney Marinelli was seen today for thyroid problem. Diagnoses and all orders for this visit:    Acquired hypothyroidism  -     Vitamin B12 & Folate; Future  -     TSH without Reflex; Future  -     T4, Free; Future  -     TSH without Reflex; Future  -     T4, Free; Future    Vitamin D deficiency  -     Vitamin D 25 Hydroxy; Future  -     Vitamin D 25 Hydroxy; Future    Memory changes  -     Vitamin D 25 Hydroxy; Future  -     Vitamin B12 & Folate; Future  -     CBC Auto Differential; Future    Restless legs syndrome  -     CBC Auto Differential; Future  -     Iron and TIBC; Future  -     Ferritin; Future         1: Hypothyroidism due to Hashimoto's disease     TSH 2.73 Sep 2020        Keep Levothyroxine 50mcg five week (Skip Sunday)   She wants to wait   May change to six days a week of TSH > 3     Education: Reviewed how to properly take thyroid replacement. Instructed to take daily with water on an empty stomach without concomitant vitamins or calcium or other medicine. Wait for 30-45 minutes before eating. If patient is confident they missed a day of pills, they can take the missed dose with their next tablet (only for levothyroxine).     2: Vit D def   29.7 Sep 2020    MVT daily   Change to OTC D3 6000 units daily     3: Memory issues   Check B12, folate     4: Restless syndrome   Check iron studies     B12, folate, CBC, iron studies NOW     RTC in 3 months with TFTs, Vit D     Electronically signed by Ashley Grover MD on 10/1/2020 at 4:06 PM

## 2020-10-07 DIAGNOSIS — G25.81 RESTLESS LEGS SYNDROME: ICD-10-CM

## 2020-10-07 DIAGNOSIS — R41.3 MEMORY CHANGES: ICD-10-CM

## 2020-10-07 DIAGNOSIS — E03.9 ACQUIRED HYPOTHYROIDISM: ICD-10-CM

## 2020-10-07 LAB
BASOPHILS ABSOLUTE: 0 K/UL (ref 0–0.2)
BASOPHILS RELATIVE PERCENT: 0.6 %
EOSINOPHILS ABSOLUTE: 0.2 K/UL (ref 0–0.6)
EOSINOPHILS RELATIVE PERCENT: 4.2 %
FERRITIN: 18.7 NG/ML (ref 15–150)
HCT VFR BLD CALC: 41.1 % (ref 36–48)
HEMOGLOBIN: 13.7 G/DL (ref 12–16)
IRON SATURATION: 37 % (ref 15–50)
IRON: 130 UG/DL (ref 37–145)
LYMPHOCYTES ABSOLUTE: 1.9 K/UL (ref 1–5.1)
LYMPHOCYTES RELATIVE PERCENT: 36.5 %
MCH RBC QN AUTO: 28.9 PG (ref 26–34)
MCHC RBC AUTO-ENTMCNC: 33.3 G/DL (ref 31–36)
MCV RBC AUTO: 86.7 FL (ref 80–100)
MONOCYTES ABSOLUTE: 0.3 K/UL (ref 0–1.3)
MONOCYTES RELATIVE PERCENT: 5.5 %
NEUTROPHILS ABSOLUTE: 2.7 K/UL (ref 1.7–7.7)
NEUTROPHILS RELATIVE PERCENT: 53.2 %
PDW BLD-RTO: 14 % (ref 12.4–15.4)
PLATELET # BLD: 255 K/UL (ref 135–450)
PMV BLD AUTO: 9.9 FL (ref 5–10.5)
RBC # BLD: 4.74 M/UL (ref 4–5.2)
TOTAL IRON BINDING CAPACITY: 351 UG/DL (ref 260–445)
WBC # BLD: 5.1 K/UL (ref 4–11)

## 2020-10-08 LAB
FOLATE: 19.6 NG/ML (ref 4.78–24.2)
VITAMIN B-12: 645 PG/ML (ref 211–911)

## 2020-12-11 ENCOUNTER — TELEPHONE (OUTPATIENT)
Dept: FAMILY MEDICINE CLINIC | Age: 45
End: 2020-12-11

## 2020-12-11 NOTE — TELEPHONE ENCOUNTER
Josie Stewart from [facility] ([facility]) called through to alert us that he sent this message. Patient has ear pain and is requesting an OV. Please call patient to advise.

## 2020-12-11 NOTE — TELEPHONE ENCOUNTER
----- Message from [employer] sent at 12/11/2020  1:01 PM EST -----  Subject: Appointment Request    Reason for Call: Urgent (Patient Request) No Script    QUESTIONS  Type of Appointment? Established Patient  Reason for appointment request? Available appointments did not meet   patient need  Additional Information for Provider? Pt has ear pain wishes to be seen   today in office  ---------------------------------------------------------------------------  --------------  [address]  What is the best way for the office to contact you? Do not leave any   message   patient will call back for answer  Preferred Call Back Phone Number? [phone]  ---------------------------------------------------------------------------  --------------  SCRIPT ANSWERS  Relationship to Patient? Self  Appointment reason? Symptomatic  Select script based on patient symptoms? Adult No Script  (Is the patient requesting to see the provider for a procedure?)? No  (Is the patient requesting to see the provider urgently  today or   tomorrow. )? Yes  Have you been diagnosed with   tested for   or told that you are suspected of having COVID-19 (Coronavirus)? No  Have you had a fever or taken medication to treat a fever within the past   3 days? No  Have you had a cough   shortness of breath or flu-like symptoms within the past 3 days? No  Do you currently have flu-like symptoms including fever or chills   cough   shortness of breath   or difficulty breathing   or new loss of taste or smell? No  (Service Expert  click yes below to proceed with Artify It As Usual   Scheduling)?  Yes

## 2020-12-14 ENCOUNTER — OFFICE VISIT (OUTPATIENT)
Dept: FAMILY MEDICINE CLINIC | Age: 45
End: 2020-12-14
Payer: COMMERCIAL

## 2020-12-14 VITALS
WEIGHT: 162 LBS | HEART RATE: 61 BPM | BODY MASS INDEX: 27.66 KG/M2 | HEIGHT: 64 IN | OXYGEN SATURATION: 97 % | SYSTOLIC BLOOD PRESSURE: 118 MMHG | DIASTOLIC BLOOD PRESSURE: 74 MMHG

## 2020-12-14 DIAGNOSIS — Z98.890 HISTORY OF ROOT CANAL PROCEDURE: ICD-10-CM

## 2020-12-14 LAB
ANION GAP SERPL CALCULATED.3IONS-SCNC: 14 MMOL/L (ref 3–16)
BUN BLDV-MCNC: 8 MG/DL (ref 7–20)
CALCIUM SERPL-MCNC: 9.4 MG/DL (ref 8.3–10.6)
CHLORIDE BLD-SCNC: 105 MMOL/L (ref 99–110)
CO2: 23 MMOL/L (ref 21–32)
CREAT SERPL-MCNC: 0.6 MG/DL (ref 0.6–1.1)
GFR AFRICAN AMERICAN: >60
GFR NON-AFRICAN AMERICAN: >60
GLUCOSE BLD-MCNC: 79 MG/DL (ref 70–99)
POTASSIUM SERPL-SCNC: 4.4 MMOL/L (ref 3.5–5.1)
SODIUM BLD-SCNC: 142 MMOL/L (ref 136–145)

## 2020-12-14 PROCEDURE — 99213 OFFICE O/P EST LOW 20 MIN: CPT | Performed by: NURSE PRACTITIONER

## 2020-12-14 RX ORDER — FLUTICASONE PROPIONATE 50 MCG
1-2 SPRAY, SUSPENSION (ML) NASAL DAILY
Qty: 1 BOTTLE | Refills: 1 | Status: SHIPPED | OUTPATIENT
Start: 2020-12-14 | End: 2021-01-07 | Stop reason: ALTCHOICE

## 2020-12-14 ASSESSMENT — PATIENT HEALTH QUESTIONNAIRE - PHQ9
4. FEELING TIRED OR HAVING LITTLE ENERGY: 0
8. MOVING OR SPEAKING SO SLOWLY THAT OTHER PEOPLE COULD HAVE NOTICED. OR THE OPPOSITE, BEING SO FIGETY OR RESTLESS THAT YOU HAVE BEEN MOVING AROUND A LOT MORE THAN USUAL: 0
5. POOR APPETITE OR OVEREATING: 0
3. TROUBLE FALLING OR STAYING ASLEEP: 0
9. THOUGHTS THAT YOU WOULD BE BETTER OFF DEAD, OR OF HURTING YOURSELF: 0
10. IF YOU CHECKED OFF ANY PROBLEMS, HOW DIFFICULT HAVE THESE PROBLEMS MADE IT FOR YOU TO DO YOUR WORK, TAKE CARE OF THINGS AT HOME, OR GET ALONG WITH OTHER PEOPLE: 0
SUM OF ALL RESPONSES TO PHQ QUESTIONS 1-9: 3
SUM OF ALL RESPONSES TO PHQ QUESTIONS 1-9: 3
1. LITTLE INTEREST OR PLEASURE IN DOING THINGS: 3
7. TROUBLE CONCENTRATING ON THINGS, SUCH AS READING THE NEWSPAPER OR WATCHING TELEVISION: 0
SUM OF ALL RESPONSES TO PHQ QUESTIONS 1-9: 3
2. FEELING DOWN, DEPRESSED OR HOPELESS: 0
6. FEELING BAD ABOUT YOURSELF - OR THAT YOU ARE A FAILURE OR HAVE LET YOURSELF OR YOUR FAMILY DOWN: 0
SUM OF ALL RESPONSES TO PHQ9 QUESTIONS 1 & 2: 3

## 2020-12-14 ASSESSMENT — ENCOUNTER SYMPTOMS: RHINORRHEA: 0

## 2020-12-14 NOTE — PROGRESS NOTES
[facility]  [facility]  [facility] [address]: [phone]         2020     Nellie Sandifer (:  1975) is a 41 y.o. female, here for evaluation of the following medical concerns:    Chief Complaint   Patient presents with    Otalgia     Patient complains of left ear pain x1 week        HPI  Left ear pain  X 1 week  No popping  + pressure  Can have sharp pain  Worse when lies on that side  Ibuprofen    2 root canals- steroids, Ibuprofen, abx    Review of Systems   Constitutional: Negative for chills and fever. HENT: Positive for ear pain. Negative for congestion, ear discharge, rhinorrhea and tinnitus. Neurological: Negative for dizziness and headaches. Prior to Visit Medications    Medication Sig Taking? Authorizing Provider   fluticasone (FLONASE) 50 MCG/ACT nasal spray 1-2 sprays by Each Nostril route daily Yes MANJU Montelongo - CNP   levothyroxine (SYNTHROID) 50 MCG tablet TAKE 1 TABLET BY MOUTH DAILY FOR 5 DAYS OF THE WEEK Yes Tammy Hayes MD        Social History     Tobacco Use    Smoking status: Never Smoker    Smokeless tobacco: Never Used   Substance Use Topics    Alcohol use: Never     Frequency: Never        Vitals:    20 0927   BP: 118/74   Site: Right Upper Arm   Position: Sitting   Cuff Size: Medium Adult   Pulse: 61   SpO2: 97%   Weight: 162 lb (73.5 kg)   Height: 5' 4\" (1.626 m)     Estimated body mass index is 27.81 kg/m² as calculated from the following:    Height as of this encounter: 5' 4\" (1.626 m). Weight as of this encounter: 162 lb (73.5 kg). Physical Exam  Vitals signs reviewed. Constitutional:       Appearance: Normal appearance. HENT:      Head: Normocephalic. Right Ear: Tympanic membrane, ear canal and external ear normal.      Left Ear: Ear canal and external ear normal. A middle ear effusion is present.    Cardiovascular:

## 2021-01-04 DIAGNOSIS — E55.9 VITAMIN D DEFICIENCY: ICD-10-CM

## 2021-01-04 DIAGNOSIS — E03.9 ACQUIRED HYPOTHYROIDISM: ICD-10-CM

## 2021-01-04 LAB
T4 FREE: 1.4 NG/DL (ref 0.9–1.8)
TSH SERPL DL<=0.05 MIU/L-ACNC: 5.57 UIU/ML (ref 0.27–4.2)
VITAMIN D 25-HYDROXY: 45.9 NG/ML

## 2021-01-07 ENCOUNTER — OFFICE VISIT (OUTPATIENT)
Dept: ENDOCRINOLOGY | Age: 46
End: 2021-01-07
Payer: COMMERCIAL

## 2021-01-07 VITALS
BODY MASS INDEX: 27.49 KG/M2 | WEIGHT: 161 LBS | TEMPERATURE: 97.6 F | OXYGEN SATURATION: 97 % | HEART RATE: 64 BPM | SYSTOLIC BLOOD PRESSURE: 102 MMHG | DIASTOLIC BLOOD PRESSURE: 69 MMHG | HEIGHT: 64 IN

## 2021-01-07 DIAGNOSIS — E06.3 HYPOTHYROIDISM DUE TO HASHIMOTO'S THYROIDITIS: Primary | ICD-10-CM

## 2021-01-07 DIAGNOSIS — G25.81 RESTLESS LEGS SYNDROME: ICD-10-CM

## 2021-01-07 DIAGNOSIS — E03.8 HYPOTHYROIDISM DUE TO HASHIMOTO'S THYROIDITIS: Primary | ICD-10-CM

## 2021-01-07 DIAGNOSIS — E55.9 VITAMIN D DEFICIENCY: ICD-10-CM

## 2021-01-07 PROCEDURE — 99213 OFFICE O/P EST LOW 20 MIN: CPT | Performed by: INTERNAL MEDICINE

## 2021-01-07 RX ORDER — LEVOTHYROXINE SODIUM 0.05 MG/1
TABLET ORAL
Qty: 90 TABLET | Refills: 1 | Status: SHIPPED | OUTPATIENT
Start: 2021-01-07 | End: 2021-06-10 | Stop reason: SDUPTHER

## 2021-01-07 NOTE — PROGRESS NOTES
Patient ID: Lanre Zuleta is a 41 y.o. female    Chief Complaint: Hypothyroidism, Vit D Def     HPI:   Lanre Zuleta is here for an evaluation of hypothyroidism diagnosed in July 2019 when TSH was 8.93 in July 2019. TPO ab high July 2019     Takes Levothyroxine 50 mcg, five days a week skipping Saturday and Sunday. In morning empty stomach with water and waits for 45 minutes. Last two months she was on antibiotics and pain killer.s She took antibiotics with levothyroxine. Energy levels better   No change sin skin or hair   Weight stable   No heat or cold intolerance. Sometimes she has hot flashes - excessive sweating before periods, PMDD. Anxiety/depression stable   Sleep is not good. Restless leg symptoms. Denies tremors, palpitations  No neck pain, compressive neck symptoms   Menstrual cycles are regular but longer     Family history of thyroid disorder: None   No neck radiation  No Recent iodine loading in form of contrast material for diagnostic studies/cardiac cath  Taking ibuprofen for dental issues. Magnesium prn, MVT/ collagen daily. B12 daily.  No other food supplements, herbs or medications including Biotin  No Recent URTI    Vit D Def:   OTC D3 4000 units daily     The following portions of the patient's history were reviewed and updated as appropriate:       Family History   Problem Relation Age of Onset    Cancer Mother     High Blood Pressure Father     Stroke Father             Social History     Socioeconomic History    Marital status:      Spouse name: Not on file    Number of children: Not on file    Years of education: Not on file    Highest education level: Not on file   Occupational History    Not on file   Social Needs    Financial resource strain: Not on file    Food insecurity     Worry: Not on file     Inability: Not on file    Transportation needs     Medical: Not on file     Non-medical: Not on file   Tobacco Use    Smoking status: Never Smoker    Smokeless tobacco: Never Used   Substance and Sexual Activity    Alcohol use: Never     Frequency: Never    Drug use: Never    Sexual activity: Not Currently   Lifestyle    Physical activity     Days per week: Not on file     Minutes per session: Not on file    Stress: Not on file   Relationships    Social connections     Talks on phone: Not on file     Gets together: Not on file     Attends Synagogue service: Not on file     Active member of club or organization: Not on file     Attends meetings of clubs or organizations: Not on file     Relationship status: Not on file    Intimate partner violence     Fear of current or ex partner: Not on file     Emotionally abused: Not on file     Physically abused: Not on file     Forced sexual activity: Not on file   Other Topics Concern    Not on file   Social History Narrative    Not on file           Past Medical History:   Diagnosis Date    Hypothyroidism          Past Surgical History:   Procedure Laterality Date    FOOT SURGERY Right     2002    FOOT SURGERY Left     2012         No Known Allergies        Current Outpatient Medications:     levothyroxine (SYNTHROID) 50 MCG tablet, TAKE 1 TABLET BY MOUTH DAILY FOR 5 DAYS OF THE WEEK, Disp: 90 tablet, Rfl: 1      Review of Systems:  Constitutional: Negative for fever, chills. HENT: Negative for congestion, ear pain, rhinorrhea,  sore throat and trouble swallowing. Eyes: Negative for photophobia, redness, itching. Respiratory: Negative for cough, shortness of breath and sputum. Cardiovascular: Negative for chest pain and leg swelling. Gastrointestinal: Negative for nausea, vomiting, abdominal pain, diarrhea, constipation. Endocrine: Negative for polydipsia, polyphagia and polyuria. Genitourinary: Negative for dysuria, urgency, frequency, hematuria and flank pain. Musculoskeletal: Negative for myalgias, back pain, arthralgias and neck pain. Skin/Nail: Negative for rash, itching. Normal nails. Neurological: Negative for seizures, light-headedness, numbness and headaches. Hematological/ Lymph nodes: Negative for adenopathy. Does not bruise/bleed easily. Psychiatric/Behavioral: Negative for suicidal ideas and decreased concentration. Physical Exam:  /69 (Site: Right Upper Arm, Position: Sitting, Cuff Size: Large Adult)   Pulse 64   Temp 97.6 °F (36.4 °C) (Temporal)   Ht 5' 4\" (1.626 m)   Wt 161 lb (73 kg)   LMP 12/23/2020   SpO2 97%   Breastfeeding No   BMI 27.64 kg/m²   Constitutional: Patient is oriented to person, place, and time. Patient appears well-developed and well-nourished. Pulmonary/Chest: Effort normal   Neurological: Patient is alert and oriented to person, place, and time. Patient has normal reflexes. Psychiatric: Patient has a normal mood and affect.  Patient behavior is normal.     Lab Review:    Orders Only on 01/04/2021   Component Date Value Ref Range Status    Vit D, 25-Hydroxy 01/04/2021 45.9  >=30 ng/mL Final    T4 Free 01/04/2021 1.4  0.9 - 1.8 ng/dL Final    TSH 01/04/2021 5.57* 0.27 - 4.20 uIU/mL Final   Orders Only on 12/14/2020   Component Date Value Ref Range Status    Sodium 12/14/2020 142  136 - 145 mmol/L Final    Potassium 12/14/2020 4.4  3.5 - 5.1 mmol/L Final    Chloride 12/14/2020 105  99 - 110 mmol/L Final    CO2 12/14/2020 23  21 - 32 mmol/L Final    Anion Gap 12/14/2020 14  3 - 16 Final    Glucose 12/14/2020 79  70 - 99 mg/dL Final    BUN 12/14/2020 8  7 - 20 mg/dL Final    CREATININE 12/14/2020 0.6  0.6 - 1.1 mg/dL Final    GFR Non- 12/14/2020 >60  >60 Final    GFR  12/14/2020 >60  >60 Final    Calcium 12/14/2020 9.4  8.3 - 10.6 mg/dL Final   Orders Only on 10/07/2020   Component Date Value Ref Range Status    Ferritin 10/07/2020 18.7  15.0 - 150.0 ng/mL Final    Iron 10/07/2020 130  37 - 145 ug/dL Final    TIBC 10/07/2020 351  260 - 445 ug/dL Final    Iron Saturation 10/07/2020 37  15 - 50 % Final    Vitamin B-12 10/07/2020 645  211 - 911 pg/mL Final    Folate 10/07/2020 19.60  4.78 - 24.20 ng/mL Final    WBC 10/07/2020 5.1  4.0 - 11.0 K/uL Final    RBC 10/07/2020 4.74  4.00 - 5.20 M/uL Final    Hemoglobin 10/07/2020 13.7  12.0 - 16.0 g/dL Final    Hematocrit 10/07/2020 41.1  36.0 - 48.0 % Final    MCV 10/07/2020 86.7  80.0 - 100.0 fL Final    MCH 10/07/2020 28.9  26.0 - 34.0 pg Final    MCHC 10/07/2020 33.3  31.0 - 36.0 g/dL Final    RDW 10/07/2020 14.0  12.4 - 15.4 % Final    Platelets 43/76/8392 255  135 - 450 K/uL Final    MPV 10/07/2020 9.9  5.0 - 10.5 fL Final    Neutrophils % 10/07/2020 53.2  % Final    Lymphocytes % 10/07/2020 36.5  % Final    Monocytes % 10/07/2020 5.5  % Final    Eosinophils % 10/07/2020 4.2  % Final    Basophils % 10/07/2020 0.6  % Final    Neutrophils Absolute 10/07/2020 2.7  1.7 - 7.7 K/uL Final    Lymphocytes Absolute 10/07/2020 1.9  1.0 - 5.1 K/uL Final    Monocytes Absolute 10/07/2020 0.3  0.0 - 1.3 K/uL Final    Eosinophils Absolute 10/07/2020 0.2  0.0 - 0.6 K/uL Final    Basophils Absolute 10/07/2020 0.0  0.0 - 0.2 K/uL Final   Orders Only on 09/25/2020   Component Date Value Ref Range Status    Vit D, 25-Hydroxy 09/25/2020 29.7* >=30 ng/mL Final    TSH Reflex FT4 09/25/2020 2.73  0.27 - 4.20 uIU/mL Final   Orders Only on 06/26/2020   Component Date Value Ref Range Status    TSH Reflex FT4 06/26/2020 3.34  0.27 - 4.20 uIU/mL Final   Orders Only on 01/10/2020   Component Date Value Ref Range Status    TSH Reflex FT4 01/10/2020 3.91  0.27 - 4.20 uIU/mL Final    Vit D, 25-Hydroxy 01/10/2020 30.2  >=30 ng/mL Final        No results found. Assessment/Plan:     Pauline was seen today for follow-up.     Diagnoses and all orders for this visit:    Hypothyroidism due to Hashimoto's thyroiditis  -     levothyroxine (SYNTHROID) 50 MCG tablet; TAKE 1 TABLET BY MOUTH DAILY FOR 5 DAYS OF THE WEEK  -     TSH without Reflex; Standing  -     T4, Free; Standing    Vitamin D deficiency    Restless legs syndrome         1: Hypothyroidism due to Hashimoto's disease     TSH 5.7, Ft4 1.4 - Jan 2021    Since she took levothyroxine in am with antibiotics     Keep Levothyroxine 50mcg five days a week (Skip Saturday and  Sunday)   She wants to wait   May change to six days a week if TSH > 3     Education: Reviewed how to properly take thyroid replacement. Instructed to take daily with water on an empty stomach without concomitant vitamins or calcium or other medicine. Wait for 30-45 minutes before eating. If patient is confident they missed a day of pills, they can take the missed dose with their next tablet (only for levothyroxine).     2: Vit D def   45.9 Jan 2021    MVT daily   C/w OTC D3 6000 units daily     3: Memory issues   Normal B12, folate     4: Restless syndrome   Iron levels normal     TFTs in 2 months and then in 4 months       Electronically signed by Leesa Perez MD on 1/7/2021 at 4:51 PM

## 2021-04-08 DIAGNOSIS — E03.8 HYPOTHYROIDISM DUE TO HASHIMOTO'S THYROIDITIS: ICD-10-CM

## 2021-04-08 DIAGNOSIS — E06.3 HYPOTHYROIDISM DUE TO HASHIMOTO'S THYROIDITIS: ICD-10-CM

## 2021-04-08 LAB
T4 FREE: 1.3 NG/DL (ref 0.9–1.8)
TSH SERPL DL<=0.05 MIU/L-ACNC: 2.75 UIU/ML (ref 0.27–4.2)

## 2021-06-03 DIAGNOSIS — E03.8 HYPOTHYROIDISM DUE TO HASHIMOTO'S THYROIDITIS: ICD-10-CM

## 2021-06-03 DIAGNOSIS — E06.3 HYPOTHYROIDISM DUE TO HASHIMOTO'S THYROIDITIS: ICD-10-CM

## 2021-06-03 LAB
T4 FREE: 1.3 NG/DL (ref 0.9–1.8)
TSH SERPL DL<=0.05 MIU/L-ACNC: 2.34 UIU/ML (ref 0.27–4.2)

## 2021-06-10 ENCOUNTER — OFFICE VISIT (OUTPATIENT)
Dept: ENDOCRINOLOGY | Age: 46
End: 2021-06-10
Payer: COMMERCIAL

## 2021-06-10 VITALS
SYSTOLIC BLOOD PRESSURE: 115 MMHG | HEIGHT: 63 IN | WEIGHT: 158 LBS | OXYGEN SATURATION: 97 % | HEART RATE: 74 BPM | DIASTOLIC BLOOD PRESSURE: 76 MMHG | BODY MASS INDEX: 28 KG/M2

## 2021-06-10 DIAGNOSIS — E06.3 HYPOTHYROIDISM DUE TO HASHIMOTO'S THYROIDITIS: ICD-10-CM

## 2021-06-10 DIAGNOSIS — E55.9 VITAMIN D DEFICIENCY: Primary | ICD-10-CM

## 2021-06-10 DIAGNOSIS — E03.8 HYPOTHYROIDISM DUE TO HASHIMOTO'S THYROIDITIS: ICD-10-CM

## 2021-06-10 PROCEDURE — 99213 OFFICE O/P EST LOW 20 MIN: CPT | Performed by: INTERNAL MEDICINE

## 2021-06-10 RX ORDER — LEVOTHYROXINE SODIUM 0.05 MG/1
TABLET ORAL
Qty: 90 TABLET | Refills: 3 | Status: SHIPPED | OUTPATIENT
Start: 2021-06-10 | End: 2022-02-21 | Stop reason: SDUPTHER

## 2021-06-10 NOTE — PROGRESS NOTES
Patient ID: Abdulkadir Winston is a 40 y.o. female    Chief Complaint: Hypothyroidism, Vit D Def     HPI:   Abdulkadir Winston is here for an evaluation of hypothyroidism diagnosed in July 2019 when TSH was 8.93 in July 2019. TPO ab high July 2019     Takes Levothyroxine 50 mcg, five days a week skipping Saturday and Sunday. In morning empty stomach with water and waits for 45 minutes. Last two months she was on antibiotics and pain killer.s She took antibiotics with levothyroxine. Energy levels better   No changes in skin or hair   Weight loss of 3 lbs . Cutting down on sugars   No heat or cold intolerance. Sometimes she has hot flashes - excessive sweating before periods associated with emotions, irritability. Sleep is not good. Restless leg symptoms. Denies tremors, palpitations  No neck pain, compressive neck symptoms   Menstrual cycles are regular but longer     Family history of thyroid disorder: None   No neck radiation  No Recent iodine loading in form of contrast material for diagnostic studies/cardiac cath  Magnesium prn, MVT/ collagen daily. B12 daily.  No other food supplements, herbs or medications including Biotin  No Recent URTI    Vit D Def:   OTC D3 6000 units daily     The following portions of the patient's history were reviewed and updated as appropriate:       Family History   Problem Relation Age of Onset    Cancer Mother     High Blood Pressure Father     Stroke Father             Social History     Socioeconomic History    Marital status:      Spouse name: Not on file    Number of children: Not on file    Years of education: Not on file    Highest education level: Not on file   Occupational History    Not on file   Tobacco Use    Smoking status: Never Smoker    Smokeless tobacco: Never Used   Vaping Use    Vaping Use: Never used   Substance and Sexual Activity    Alcohol use: Never    Drug use: Never    Sexual activity: Not Currently   Other Topics Concern    Not on file and neck pain. Skin/Nail: Negative for rash, itching. Normal nails. Neurological: Negative for seizures, light-headedness, numbness and headaches. Hematological/ Lymph nodes: Negative for adenopathy. Does not bruise/bleed easily. Psychiatric/Behavioral: Negative for suicidal ideas and decreased concentration. Physical Exam:  /76 (Site: Right Upper Arm, Position: Sitting, Cuff Size: Large Adult)   Pulse 74   Ht 5' 3\" (1.6 m)   Wt 158 lb (71.7 kg)   LMP 05/06/2021   SpO2 97%   BMI 27.99 kg/m²   Constitutional: Patient is oriented to person, place, and time. Patient appears well-developed and well-nourished. Pulmonary/Chest: Effort normal   Neurological: Patient is alert and oriented to person, place, and time. Patient has normal reflexes. Psychiatric: Patient has a normal mood and affect.  Patient behavior is normal.     Lab Review:    Orders Only on 06/03/2021   Component Date Value Ref Range Status    T4 Free 06/03/2021 1.3  0.9 - 1.8 ng/dL Final    TSH 06/03/2021 2.34  0.27 - 4.20 uIU/mL Final   Orders Only on 04/08/2021   Component Date Value Ref Range Status    TSH 04/08/2021 2.75  0.27 - 4.20 uIU/mL Final    T4 Free 04/08/2021 1.3  0.9 - 1.8 ng/dL Final   Orders Only on 01/04/2021   Component Date Value Ref Range Status    Vit D, 25-Hydroxy 01/04/2021 45.9  >=30 ng/mL Final    T4 Free 01/04/2021 1.4  0.9 - 1.8 ng/dL Final    TSH 01/04/2021 5.57* 0.27 - 4.20 uIU/mL Final   Orders Only on 12/14/2020   Component Date Value Ref Range Status    Sodium 12/14/2020 142  136 - 145 mmol/L Final    Potassium 12/14/2020 4.4  3.5 - 5.1 mmol/L Final    Chloride 12/14/2020 105  99 - 110 mmol/L Final    CO2 12/14/2020 23  21 - 32 mmol/L Final    Anion Gap 12/14/2020 14  3 - 16 Final    Glucose 12/14/2020 79  70 - 99 mg/dL Final    BUN 12/14/2020 8  7 - 20 mg/dL Final    CREATININE 12/14/2020 0.6  0.6 - 1.1 mg/dL Final    GFR Non- 12/14/2020 >60  >60 Final    GFR Vitamin D 25 Hydroxy; Future    Hypothyroidism due to Hashimoto's thyroiditis  -     levothyroxine (SYNTHROID) 50 MCG tablet; TAKE 1 TABLET BY MOUTH DAILY FOR 5 DAYS OF THE WEEK  -     TSH without Reflex; Future  -     T4, Free; Future         1: Hypothyroidism due to Hashimoto's disease     TSH 2.34, Ft4 1.3-  June 2021    Keep Levothyroxine 50mcg five days a week (Skip Saturday and  Sunday)     Education: Reviewed how to properly take thyroid replacement. Instructed to take daily with water on an empty stomach without concomitant vitamins or calcium or other medicine. Wait for 30-45 minutes before eating. If patient is confident they missed a day of pills, they can take the missed dose with their next tablet (only for levothyroxine).     2: Vit D def   45.9 Jan 2021    MVT daily   C/w OTC D3 6000 units daily     3: Memory issues   Normal B12, folate     4: Restless syndrome   Iron levels normal     TFTs, Vit D  in 12 months or early prn       Electronically signed by Enmanuel Tyler MD on 6/10/2021 at 4:28 PM

## 2022-02-21 DIAGNOSIS — E06.3 HYPOTHYROIDISM DUE TO HASHIMOTO'S THYROIDITIS: ICD-10-CM

## 2022-02-21 DIAGNOSIS — E03.8 HYPOTHYROIDISM DUE TO HASHIMOTO'S THYROIDITIS: ICD-10-CM

## 2022-02-21 RX ORDER — LEVOTHYROXINE SODIUM 0.05 MG/1
TABLET ORAL
Qty: 90 TABLET | Refills: 3 | Status: SHIPPED | OUTPATIENT
Start: 2022-02-21 | End: 2022-06-09 | Stop reason: SDUPTHER

## 2022-04-08 ENCOUNTER — OFFICE VISIT (OUTPATIENT)
Dept: FAMILY MEDICINE CLINIC | Age: 47
End: 2022-04-08
Payer: COMMERCIAL

## 2022-04-08 VITALS
TEMPERATURE: 96.8 F | OXYGEN SATURATION: 97 % | HEIGHT: 64 IN | WEIGHT: 158.2 LBS | SYSTOLIC BLOOD PRESSURE: 106 MMHG | DIASTOLIC BLOOD PRESSURE: 68 MMHG | BODY MASS INDEX: 27.01 KG/M2 | RESPIRATION RATE: 16 BRPM | HEART RATE: 59 BPM

## 2022-04-08 DIAGNOSIS — Z00.00 WELL ADULT EXAM: Primary | ICD-10-CM

## 2022-04-08 DIAGNOSIS — E06.3 HYPOTHYROIDISM DUE TO HASHIMOTO'S THYROIDITIS: ICD-10-CM

## 2022-04-08 DIAGNOSIS — D18.01 CHERRY ANGIOMA: ICD-10-CM

## 2022-04-08 DIAGNOSIS — N89.8 VAGINAL DISCHARGE: ICD-10-CM

## 2022-04-08 DIAGNOSIS — R61 NIGHT SWEATS: ICD-10-CM

## 2022-04-08 DIAGNOSIS — Z12.31 ENCOUNTER FOR SCREENING MAMMOGRAM FOR MALIGNANT NEOPLASM OF BREAST: ICD-10-CM

## 2022-04-08 DIAGNOSIS — E03.8 HYPOTHYROIDISM DUE TO HASHIMOTO'S THYROIDITIS: ICD-10-CM

## 2022-04-08 PROCEDURE — 99396 PREV VISIT EST AGE 40-64: CPT | Performed by: NURSE PRACTITIONER

## 2022-04-08 SDOH — ECONOMIC STABILITY: TRANSPORTATION INSECURITY
IN THE PAST 12 MONTHS, HAS THE LACK OF TRANSPORTATION KEPT YOU FROM MEDICAL APPOINTMENTS OR FROM GETTING MEDICATIONS?: NO

## 2022-04-08 SDOH — ECONOMIC STABILITY: FOOD INSECURITY: WITHIN THE PAST 12 MONTHS, THE FOOD YOU BOUGHT JUST DIDN'T LAST AND YOU DIDN'T HAVE MONEY TO GET MORE.: NEVER TRUE

## 2022-04-08 SDOH — ECONOMIC STABILITY: FOOD INSECURITY: WITHIN THE PAST 12 MONTHS, YOU WORRIED THAT YOUR FOOD WOULD RUN OUT BEFORE YOU GOT MONEY TO BUY MORE.: NEVER TRUE

## 2022-04-08 SDOH — ECONOMIC STABILITY: TRANSPORTATION INSECURITY
IN THE PAST 12 MONTHS, HAS LACK OF TRANSPORTATION KEPT YOU FROM MEETINGS, WORK, OR FROM GETTING THINGS NEEDED FOR DAILY LIVING?: NO

## 2022-04-08 ASSESSMENT — PATIENT HEALTH QUESTIONNAIRE - PHQ9
SUM OF ALL RESPONSES TO PHQ9 QUESTIONS 1 & 2: 0
SUM OF ALL RESPONSES TO PHQ QUESTIONS 1-9: 1
3. TROUBLE FALLING OR STAYING ASLEEP: 0
4. FEELING TIRED OR HAVING LITTLE ENERGY: 0
1. LITTLE INTEREST OR PLEASURE IN DOING THINGS: 0
8. MOVING OR SPEAKING SO SLOWLY THAT OTHER PEOPLE COULD HAVE NOTICED. OR THE OPPOSITE, BEING SO FIGETY OR RESTLESS THAT YOU HAVE BEEN MOVING AROUND A LOT MORE THAN USUAL: 0
2. FEELING DOWN, DEPRESSED OR HOPELESS: 0
5. POOR APPETITE OR OVEREATING: 0
7. TROUBLE CONCENTRATING ON THINGS, SUCH AS READING THE NEWSPAPER OR WATCHING TELEVISION: 0
6. FEELING BAD ABOUT YOURSELF - OR THAT YOU ARE A FAILURE OR HAVE LET YOURSELF OR YOUR FAMILY DOWN: 1
10. IF YOU CHECKED OFF ANY PROBLEMS, HOW DIFFICULT HAVE THESE PROBLEMS MADE IT FOR YOU TO DO YOUR WORK, TAKE CARE OF THINGS AT HOME, OR GET ALONG WITH OTHER PEOPLE: 0
9. THOUGHTS THAT YOU WOULD BE BETTER OFF DEAD, OR OF HURTING YOURSELF: 0
SUM OF ALL RESPONSES TO PHQ QUESTIONS 1-9: 1

## 2022-04-08 ASSESSMENT — ANXIETY QUESTIONNAIRES
GAD7 TOTAL SCORE: 17
2. NOT BEING ABLE TO STOP OR CONTROL WORRYING: 3
3. WORRYING TOO MUCH ABOUT DIFFERENT THINGS: 3
6. BECOMING EASILY ANNOYED OR IRRITABLE: 2
5. BEING SO RESTLESS THAT IT IS HARD TO SIT STILL: 2
1. FEELING NERVOUS, ANXIOUS, OR ON EDGE: 2
IF YOU CHECKED OFF ANY PROBLEMS ON THIS QUESTIONNAIRE, HOW DIFFICULT HAVE THESE PROBLEMS MADE IT FOR YOU TO DO YOUR WORK, TAKE CARE OF THINGS AT HOME, OR GET ALONG WITH OTHER PEOPLE: SOMEWHAT DIFFICULT
4. TROUBLE RELAXING: 3
7. FEELING AFRAID AS IF SOMETHING AWFUL MIGHT HAPPEN: 2

## 2022-04-08 ASSESSMENT — SOCIAL DETERMINANTS OF HEALTH (SDOH): HOW HARD IS IT FOR YOU TO PAY FOR THE VERY BASICS LIKE FOOD, HOUSING, MEDICAL CARE, AND HEATING?: NOT HARD AT ALL

## 2022-04-09 LAB
CANDIDA SPECIES, DNA PROBE: NORMAL
GARDNERELLA VAGINALIS, DNA PROBE: NORMAL
TRICHOMONAS VAGINALIS DNA: NORMAL

## 2022-04-14 DIAGNOSIS — Z00.00 WELL ADULT EXAM: ICD-10-CM

## 2022-04-14 LAB
ANION GAP SERPL CALCULATED.3IONS-SCNC: 13 MMOL/L (ref 3–16)
BUN BLDV-MCNC: 11 MG/DL (ref 7–20)
CALCIUM SERPL-MCNC: 9 MG/DL (ref 8.3–10.6)
CHLORIDE BLD-SCNC: 107 MMOL/L (ref 99–110)
CHOLESTEROL, TOTAL: 219 MG/DL (ref 0–199)
CO2: 22 MMOL/L (ref 21–32)
CREAT SERPL-MCNC: 0.7 MG/DL (ref 0.6–1.1)
FERRITIN: 13.2 NG/ML (ref 15–150)
GFR AFRICAN AMERICAN: >60
GFR NON-AFRICAN AMERICAN: >60
GLUCOSE BLD-MCNC: 107 MG/DL (ref 70–99)
HCT VFR BLD CALC: 40.6 % (ref 36–48)
HDLC SERPL-MCNC: 71 MG/DL (ref 40–60)
HEMOGLOBIN: 13.6 G/DL (ref 12–16)
IRON SATURATION: 11 % (ref 15–50)
IRON: 44 UG/DL (ref 37–145)
LDL CHOLESTEROL CALCULATED: 137 MG/DL
MCH RBC QN AUTO: 28.7 PG (ref 26–34)
MCHC RBC AUTO-ENTMCNC: 33.5 G/DL (ref 31–36)
MCV RBC AUTO: 85.8 FL (ref 80–100)
PDW BLD-RTO: 14.7 % (ref 12.4–15.4)
PLATELET # BLD: 249 K/UL (ref 135–450)
PMV BLD AUTO: 10 FL (ref 5–10.5)
POTASSIUM SERPL-SCNC: 4.5 MMOL/L (ref 3.5–5.1)
RBC # BLD: 4.73 M/UL (ref 4–5.2)
SODIUM BLD-SCNC: 142 MMOL/L (ref 136–145)
TOTAL IRON BINDING CAPACITY: 390 UG/DL (ref 260–445)
TRIGL SERPL-MCNC: 53 MG/DL (ref 0–150)
VLDLC SERPL CALC-MCNC: 11 MG/DL
WBC # BLD: 4.8 K/UL (ref 4–11)

## 2022-06-03 DIAGNOSIS — E06.3 HYPOTHYROIDISM DUE TO HASHIMOTO'S THYROIDITIS: ICD-10-CM

## 2022-06-03 DIAGNOSIS — E55.9 VITAMIN D DEFICIENCY: ICD-10-CM

## 2022-06-03 DIAGNOSIS — E03.8 HYPOTHYROIDISM DUE TO HASHIMOTO'S THYROIDITIS: ICD-10-CM

## 2022-06-03 LAB
T4 FREE: 1.4 NG/DL (ref 0.9–1.8)
TSH SERPL DL<=0.05 MIU/L-ACNC: 2.77 UIU/ML (ref 0.27–4.2)
VITAMIN D 25-HYDROXY: 63.7 NG/ML

## 2022-06-09 ENCOUNTER — OFFICE VISIT (OUTPATIENT)
Dept: ENDOCRINOLOGY | Age: 47
End: 2022-06-09
Payer: COMMERCIAL

## 2022-06-09 VITALS
OXYGEN SATURATION: 99 % | SYSTOLIC BLOOD PRESSURE: 114 MMHG | HEART RATE: 54 BPM | BODY MASS INDEX: 26.94 KG/M2 | HEIGHT: 64 IN | WEIGHT: 157.8 LBS | DIASTOLIC BLOOD PRESSURE: 72 MMHG

## 2022-06-09 DIAGNOSIS — R73.01 IMPAIRED FASTING BLOOD SUGAR: ICD-10-CM

## 2022-06-09 DIAGNOSIS — E55.9 VITAMIN D DEFICIENCY: ICD-10-CM

## 2022-06-09 DIAGNOSIS — G25.81 RESTLESS LEGS SYNDROME: ICD-10-CM

## 2022-06-09 DIAGNOSIS — E06.3 HYPOTHYROIDISM DUE TO HASHIMOTO'S THYROIDITIS: Primary | ICD-10-CM

## 2022-06-09 DIAGNOSIS — E03.8 HYPOTHYROIDISM DUE TO HASHIMOTO'S THYROIDITIS: Primary | ICD-10-CM

## 2022-06-09 DIAGNOSIS — E61.1 IRON DEFICIENCY: ICD-10-CM

## 2022-06-09 PROCEDURE — G8427 DOCREV CUR MEDS BY ELIG CLIN: HCPCS | Performed by: INTERNAL MEDICINE

## 2022-06-09 PROCEDURE — 99214 OFFICE O/P EST MOD 30 MIN: CPT | Performed by: INTERNAL MEDICINE

## 2022-06-09 PROCEDURE — G8419 CALC BMI OUT NRM PARAM NOF/U: HCPCS | Performed by: INTERNAL MEDICINE

## 2022-06-09 PROCEDURE — 1036F TOBACCO NON-USER: CPT | Performed by: INTERNAL MEDICINE

## 2022-06-09 RX ORDER — FERROUS SULFATE 325(65) MG
325 TABLET ORAL 2 TIMES DAILY
Qty: 60 TABLET | Refills: 5 | Status: SHIPPED | OUTPATIENT
Start: 2022-06-09

## 2022-06-09 RX ORDER — LEVOTHYROXINE SODIUM 0.05 MG/1
TABLET ORAL
Qty: 90 TABLET | Refills: 3 | Status: SHIPPED | OUTPATIENT
Start: 2022-06-09

## 2022-06-09 NOTE — PROGRESS NOTES
Patient ID: Shine Mcdowell is a 56 y.o. female    Chief Complaint: Hypothyroidism, Vit D Def     HPI:   Shine Mcdowell is here for an evaluation of hypothyroidism diagnosed in July 2019 when TSH was 8.93 in July 2019. TPO ab high July 2019     Family history of thyroid disorder: None   No neck radiation    Takes Levothyroxine 50 mcg, five days a week skipping Saturday and Sunday. In morning empty stomach with water and waits for 45 minutes. Energy levels are good   No changes in skin or hair   Weight loss of 1 lbs . Not working on diet much. No speicific heat or cold intolerance. Sometimes she has hot flashes - excessive sweating before periods associated with emotions, irritability. Sleep is not good. Restless leg symptoms. Denies tremors, palpitations  No neck pain, compressive neck symptoms   Menstrual cycles are regular but longer       No Recent iodine loading in form of contrast material for diagnostic studies/cardiac cath  Vit D drops,  MVT/ collagen daily.  No other food supplements, herbs or medications including Biotin  No Recent URTI    Vit D Def:   OTC D3 6000 units few days of the week     The following portions of the patient's history were reviewed and updated as appropriate:       Family History   Problem Relation Age of Onset    Cancer Mother     High Blood Pressure Father     Stroke Father             Social History     Socioeconomic History    Marital status:      Spouse name: Not on file    Number of children: Not on file    Years of education: Not on file    Highest education level: Not on file   Occupational History    Not on file   Tobacco Use    Smoking status: Never Smoker    Smokeless tobacco: Never Used   Vaping Use    Vaping Use: Never used   Substance and Sexual Activity    Alcohol use: Never    Drug use: Never    Sexual activity: Not Currently   Other Topics Concern    Not on file   Social History Narrative    Not on file     Social Determinants of Health Financial Resource Strain: Low Risk     Difficulty of Paying Living Expenses: Not hard at all   Food Insecurity: No Food Insecurity    Worried About Running Out of Food in the Last Year: Never true    Ilana of Food in the Last Year: Never true   Transportation Needs: No Transportation Needs    Lack of Transportation (Medical): No    Lack of Transportation (Non-Medical): No   Physical Activity:     Days of Exercise per Week: Not on file    Minutes of Exercise per Session: Not on file   Stress:     Feeling of Stress : Not on file   Social Connections:     Frequency of Communication with Friends and Family: Not on file    Frequency of Social Gatherings with Friends and Family: Not on file    Attends Zoroastrianism Services: Not on file    Active Member of [address] [place] [address] [employer] or Organizations: Not on file    Attends Club or Organization Meetings: Not on file    Marital Status: Not on file   Intimate Partner Violence:     Fear of Current or Ex-Partner: Not on file    Emotionally Abused: Not on file    Physically Abused: Not on file    Sexually Abused: Not on file   Housing Stability:     Unable to Pay for Housing in the Last Year: Not on file    Number of [place] in the Last Year: Not on file    Unstable Housing in the Last Year: Not on file           Past Medical History:   Diagnosis Date    Hypothyroidism          Past Surgical History:   Procedure Laterality Date    FOOT SURGERY Right     2002    FOOT SURGERY Left     2012         No Known Allergies        Current Outpatient Medications:     levothyroxine (SYNTHROID) 50 MCG tablet, TAKE 1 TABLET BY MOUTH DAILY FOR 5 DAYS OF THE WEEK, Disp: 90 tablet, Rfl: 3    ferrous sulfate (IRON 325) 325 (65 Fe) MG tablet, Take 1 tablet by mouth 2 times daily, Disp: 60 tablet, Rfl: 5      Review of Systems:  Constitutional: Negative for fever, chills. HENT: Negative for congestion, ear pain, rhinorrhea,  sore throat and trouble swallowing.    Eyes: Negative for photophobia, redness, itching. Respiratory: Negative for cough, shortness of breath and sputum. Cardiovascular: Negative for chest pain and leg swelling. Gastrointestinal: Negative for nausea, vomiting, abdominal pain, diarrhea, constipation. Endocrine: Negative for polydipsia, polyphagia and polyuria. Genitourinary: Negative for dysuria, urgency, frequency, hematuria and flank pain. Musculoskeletal: Negative for myalgias, back pain, arthralgias and neck pain. Skin/Nail: Negative for rash, itching. Normal nails. Neurological: Negative for seizures, light-headedness, numbness and headaches. Hematological/ Lymph nodes: Negative for adenopathy. Does not bruise/bleed easily. Psychiatric/Behavioral: Negative for suicidal ideas and decreased concentration. Physical Exam:  /72 (Site: Right Upper Arm, Position: Sitting, Cuff Size: Medium Adult)   Pulse 54   Ht 5' 4\" (1.626 m)   Wt 157 lb 12.8 oz (71.6 kg)   LMP 06/09/2022   SpO2 99%   BMI 27.09 kg/m²   Constitutional: Patient is oriented to person, place, and time. Patient appears well-developed and well-nourished. Pulmonary/Chest: Effort normal   Neurological: Patient is alert and oriented to person, place, and time. Patient has normal reflexes. Psychiatric: Patient has a normal mood and affect.  Patient behavior is normal.     Lab Review:    Orders Only on 06/03/2022   Component Date Value Ref Range Status    T4 Free 06/03/2022 1.4  0.9 - 1.8 ng/dL Final    TSH 06/03/2022 2.77  0.27 - 4.20 uIU/mL Final    Vit D, 25-Hydroxy 06/03/2022 63.7  >=30 ng/mL Final   Orders Only on 04/14/2022   Component Date Value Ref Range Status    Cholesterol, Total 04/14/2022 219* 0 - 199 mg/dL Final    Triglycerides 04/14/2022 53  0 - 150 mg/dL Final    HDL 04/14/2022 71* 40 - 60 mg/dL Final    LDL Calculated 04/14/2022 137* <100 mg/dL Final    VLDL Cholesterol Calculated 04/14/2022 11  Not Established mg/dL Final    Sodium 04/14/2022 142  136 - 145 mmol/L Final    Potassium 04/14/2022 4.5  3.5 - 5.1 mmol/L Final    Chloride 04/14/2022 107  99 - 110 mmol/L Final    CO2 04/14/2022 22  21 - 32 mmol/L Final    Anion Gap 04/14/2022 13  3 - 16 Final    Glucose 04/14/2022 107* 70 - 99 mg/dL Final    BUN 04/14/2022 11  7 - 20 mg/dL Final    CREATININE 04/14/2022 0.7  0.6 - 1.1 mg/dL Final    GFR Non- 04/14/2022 >60  >60 Final    GFR  04/14/2022 >60  >60 Final    Calcium 04/14/2022 9.0  8.3 - 10.6 mg/dL Final    Iron 04/14/2022 44  37 - 145 ug/dL Final    TIBC 04/14/2022 390  260 - 445 ug/dL Final    Iron Saturation 04/14/2022 11* 15 - 50 % Final    Ferritin 04/14/2022 13.2* 15.0 - 150.0 ng/mL Final    WBC 04/14/2022 4.8  4.0 - 11.0 K/uL Final    RBC 04/14/2022 4.73  4.00 - 5.20 M/uL Final    Hemoglobin 04/14/2022 13.6  12.0 - 16.0 g/dL Final    Hematocrit 04/14/2022 40.6  36.0 - 48.0 % Final    MCV 04/14/2022 85.8  80.0 - 100.0 fL Final    MCH 04/14/2022 28.7  26.0 - 34.0 pg Final    MCHC 04/14/2022 33.5  31.0 - 36.0 g/dL Final    RDW 04/14/2022 14.7  12.4 - 15.4 % Final    Platelets 63/91/4984 249  135 - 450 K/uL Final    MPV 04/14/2022 10.0  5.0 - 10.5 fL Final   Office Visit on 04/08/2022   Component Date Value Ref Range Status    Trichomonas Vaginalis DNA 04/08/2022    Final                    Value:Negative DNA not detected. Normal range: Negative DNA not detected.  GARDNERELLA VAGINALIS, DNA PROBE 04/08/2022    Final                    Value:Negative DNA not detected. Normal range: Negative DNA not detected.  SCARLET SPECIES, DNA PROBE 04/08/2022    Final                    Value:Negative DNA not detected. Normal range: Negative DNA not detected. No results found. Assessment/Plan:     Pauline was seen today for hypothyroidism.     Diagnoses and all orders for this visit:    Hypothyroidism due to Hashimoto's thyroiditis  -     levothyroxine (SYNTHROID) 50 MCG tablet; TAKE 1 TABLET BY MOUTH DAILY FOR 5 DAYS OF THE WEEK  -     TSH; Future  -     T4, Free; Future    Vitamin D deficiency  -     Vitamin D 25 Hydroxy; Future    Restless legs syndrome    Iron deficiency  -     ferrous sulfate (IRON 325) 325 (65 Fe) MG tablet; Take 1 tablet by mouth 2 times daily    Impaired fasting blood sugar  -     Comprehensive Metabolic Panel; Future  -     Hemoglobin A1C; Future         1: Hypothyroidism due to Hashimoto's disease     TSH 2.77, Ft4 1.4-  June 2022    Keep Levothyroxine 50mcg five days a week (Skip Saturday and  Sunday)     Education: Reviewed how to properly take thyroid replacement. Instructed to take daily with water on an empty stomach without concomitant vitamins or calcium or other medicine. Wait for 30-45 minutes before eating. If patient is confident they missed a day of pills, they can take the missed dose with their next tablet (only for levothyroxine).     2: Vit D def   63.7 - June 2022    MVT daily   C/w OTC D3 6000 units, 2-3 days a week      3: Memory issues   Normal B12, folate - Oct 2022     4: Restless syndrome   Follows with pcp for low iron levels   Start iron sulphate 325 mg twice a day, on alternate days   See pcp in 4-6 months to have levels rechecked     5: Impaired fasting glucose   107 in Aril 2022   Rehceck with A1C next time     TFTs, Vit D  in 12 months or early prn       Electronically signed by Cuca Burroughs MD on 6/9/2022 at 4:58 PM

## 2022-07-19 ENCOUNTER — HOSPITAL ENCOUNTER (OUTPATIENT)
Dept: WOMENS IMAGING | Age: 47
Discharge: HOME | End: 2022-07-19
Payer: COMMERCIAL

## 2022-07-19 DIAGNOSIS — Z12.31 ENCOUNTER FOR SCREENING MAMMOGRAM FOR MALIGNANT NEOPLASM OF BREAST: ICD-10-CM

## 2022-07-19 PROCEDURE — 77067 SCR MAMMO BI INCL CAD: CPT

## 2022-08-02 ENCOUNTER — TELEPHONE (OUTPATIENT)
Dept: FAMILY MEDICINE CLINIC | Age: 47
End: 2022-08-02

## 2022-11-21 ENCOUNTER — APPOINTMENT (RX ONLY)
Dept: URBAN - METROPOLITAN AREA CLINIC 170 | Facility: CLINIC | Age: 47
Setting detail: DERMATOLOGY
End: 2022-11-21

## 2022-11-21 DIAGNOSIS — L81.1 CHLOASMA: ICD-10-CM

## 2022-11-21 DIAGNOSIS — B00.1 HERPESVIRAL VESICULAR DERMATITIS: ICD-10-CM

## 2022-11-21 DIAGNOSIS — D18.0 HEMANGIOMA: ICD-10-CM

## 2022-11-21 PROBLEM — D18.01 HEMANGIOMA OF SKIN AND SUBCUTANEOUS TISSUE: Status: ACTIVE | Noted: 2022-11-21

## 2022-11-21 PROCEDURE — ? COUNSELING

## 2022-11-21 PROCEDURE — ? ADDITIONAL NOTES

## 2022-11-21 PROCEDURE — ? PRESCRIPTION

## 2022-11-21 PROCEDURE — 99204 OFFICE O/P NEW MOD 45 MIN: CPT

## 2022-11-21 PROCEDURE — ? PRESCRIPTION MEDICATION MANAGEMENT

## 2022-11-21 RX ORDER — HYDROQUINONE 4 %
CREAM (GRAM) TOPICAL
Qty: 30 | Refills: 2 | Status: ERX | COMMUNITY
Start: 2022-11-21

## 2022-11-21 RX ORDER — VALACYCLOVIR 1 G/1
TABLET, FILM COATED ORAL
Qty: 4 | Refills: 4 | Status: ERX | COMMUNITY
Start: 2022-11-21

## 2022-11-21 RX ADMIN — Medication: at 00:00

## 2022-11-21 RX ADMIN — VALACYCLOVIR: 1 TABLET, FILM COATED ORAL at 00:00

## 2022-11-21 ASSESSMENT — LOCATION DETAILED DESCRIPTION DERM
LOCATION DETAILED: LEFT INFERIOR VERMILION LIP
LOCATION DETAILED: RIGHT INFERIOR FOREHEAD
LOCATION DETAILED: LEFT CENTRAL MALAR CHEEK

## 2022-11-21 ASSESSMENT — LOCATION ZONE DERM
LOCATION ZONE: LIP
LOCATION ZONE: FACE

## 2022-11-21 ASSESSMENT — LOCATION SIMPLE DESCRIPTION DERM
LOCATION SIMPLE: RIGHT FOREHEAD
LOCATION SIMPLE: LEFT LIP
LOCATION SIMPLE: LEFT CHEEK

## 2022-11-21 NOTE — HPI: DISCOLORATION
How Severe Is Your Skin Discoloration?: mild
Additional History: Face is also dry wants discuss. And she has red spots on face as well and would like checked.

## 2022-11-21 NOTE — PROCEDURE: PRESCRIPTION MEDICATION MANAGEMENT
Initiate Treatment: Valcyclovir 1gm twice a day x 2 days
Detail Level: Zone
Render In Strict Bullet Format?: No
Continue Regimen: Differin every night
Initiate Treatment: Hydroquinone 4% every night x 3 months.

## 2022-11-21 NOTE — HPI: SKIN LESION
What Type Of Note Output Would You Prefer (Optional)?: Bullet Format
How Severe Is Your Skin Lesion?: mild
Has Your Skin Lesion Been Treated?: not been treated
Is This A New Presentation, Or A Follow-Up?: Growths
Additional History: She’s been using abreva. Patient has hist of cold sores.

## 2022-11-21 NOTE — PROCEDURE: MIPS QUALITY
Quality 431: Preventive Care And Screening: Unhealthy Alcohol Use - Screening: Documentation of medical reason(s) for not screening for unhealthy alcohol use (e.g., limited life expectancy, other medical reasons)
Detail Level: Detailed
Quality 130: Documentation Of Current Medications In The Medical Record: Current Medications Documented
Quality 226: Preventive Care And Screening: Tobacco Use: Screening And Cessation Intervention: Patient screened for tobacco use and is an ex/non-smoker

## 2022-11-29 ENCOUNTER — RX ONLY (OUTPATIENT)
Age: 47
Setting detail: RX ONLY
End: 2022-11-29

## 2022-11-29 RX ORDER — PHARMACY COMPOUNDING ACCESSORY
EACH MISCELLANEOUS
Qty: 15 | Refills: 2 | Status: ERX | COMMUNITY
Start: 2022-11-29

## 2022-12-07 DIAGNOSIS — E03.8 HYPOTHYROIDISM DUE TO HASHIMOTO'S THYROIDITIS: ICD-10-CM

## 2022-12-07 DIAGNOSIS — E06.3 HYPOTHYROIDISM DUE TO HASHIMOTO'S THYROIDITIS: ICD-10-CM

## 2022-12-07 NOTE — TELEPHONE ENCOUNTER
Pt calling and states she has new insurance so she will have to use a different pharmacy for her refill .  She needs refill on her Levothyroxine sent to [facility] [address]    # [phone]

## 2022-12-08 RX ORDER — LEVOTHYROXINE SODIUM 0.05 MG/1
TABLET ORAL
Qty: 90 TABLET | Refills: 2 | Status: SHIPPED | OUTPATIENT
Start: 2022-12-08

## 2022-12-15 ENCOUNTER — OFFICE VISIT (OUTPATIENT)
Dept: FAMILY MEDICINE CLINIC | Age: 47
End: 2022-12-15
Payer: COMMERCIAL

## 2022-12-15 VITALS
DIASTOLIC BLOOD PRESSURE: 70 MMHG | HEART RATE: 60 BPM | BODY MASS INDEX: 30.39 KG/M2 | SYSTOLIC BLOOD PRESSURE: 100 MMHG | HEIGHT: 64 IN | WEIGHT: 178 LBS | OXYGEN SATURATION: 97 %

## 2022-12-15 DIAGNOSIS — M79.645 THUMB PAIN, LEFT: Primary | ICD-10-CM

## 2022-12-15 DIAGNOSIS — M25.40 JOINT SWELLING: ICD-10-CM

## 2022-12-15 DIAGNOSIS — M25.9 REDNESS OF JOINT: ICD-10-CM

## 2022-12-15 DIAGNOSIS — M79.645 THUMB PAIN, LEFT: ICD-10-CM

## 2022-12-15 LAB — URIC ACID, SERUM: 3.8 MG/DL (ref 2.6–6)

## 2022-12-15 PROCEDURE — 99203 OFFICE O/P NEW LOW 30 MIN: CPT | Performed by: NURSE PRACTITIONER

## 2022-12-15 RX ORDER — CEPHALEXIN 500 MG/1
500 CAPSULE ORAL 4 TIMES DAILY
Qty: 40 CAPSULE | Refills: 0 | Status: SHIPPED | OUTPATIENT
Start: 2022-12-15 | End: 2022-12-25

## 2022-12-15 ASSESSMENT — ENCOUNTER SYMPTOMS
EYE PAIN: 0
BLOOD IN STOOL: 0
COUGH: 0
CHEST TIGHTNESS: 0
SHORTNESS OF BREATH: 0
RHINORRHEA: 0
COLOR CHANGE: 1
BACK PAIN: 0
VOMITING: 0
TROUBLE SWALLOWING: 0
SINUS PAIN: 0
EYE DISCHARGE: 0
CONSTIPATION: 0
EYE ITCHING: 0
VOICE CHANGE: 0
DIARRHEA: 0
CHOKING: 0
STRIDOR: 0
SORE THROAT: 0
SINUS PRESSURE: 0
NAUSEA: 0
PHOTOPHOBIA: 0
ABDOMINAL PAIN: 0
WHEEZING: 0
EYE REDNESS: 0

## 2022-12-15 NOTE — PROGRESS NOTES
Chief Complaint   Patient presents with    Joint Pain     Left thumb       /70 (Site: Right Upper Arm, Position: Sitting, Cuff Size: Medium Adult)   Pulse 60   Ht 5' 4\" (1.626 m)   Wt 178 lb (80.7 kg)   SpO2 97%   BMI 30.55 kg/m²     HPI:  Nicki Desai is a 56 y.o. (: 1975) here today   for   HPI    Patient's medications, allergies, past medical, surgical, social and family histories were reviewed and updated as appropriate. Left hand pain: Left thumb, red, swollen woke up like this, denies trauma. Denies fever and chills. Limites ROM due to swelling and pain. Not happened prior. Denies hx of gout. She just finished amoxicillin from root canal. Advised to start probiotic. DD gout vs cellulitis. ROS:  Review of Systems   Constitutional:  Negative for activity change, appetite change, chills, diaphoresis, fatigue, fever and unexpected weight change. HENT:  Negative for congestion, ear discharge, ear pain, hearing loss, nosebleeds, postnasal drip, rhinorrhea, sinus pressure, sinus pain, sneezing, sore throat, tinnitus, trouble swallowing and voice change. Eyes:  Negative for photophobia, pain, discharge, redness and itching. Respiratory:  Negative for cough, choking, chest tightness, shortness of breath, wheezing and stridor. Cardiovascular:  Negative for chest pain, palpitations and leg swelling. Gastrointestinal:  Negative for abdominal pain, blood in stool, constipation, diarrhea, nausea and vomiting. Endocrine: Negative for cold intolerance, heat intolerance, polydipsia and polyuria. Genitourinary:  Negative for difficulty urinating, dysuria, enuresis, flank pain, frequency, hematuria and urgency. Musculoskeletal:  Negative for back pain, gait problem, joint swelling, neck pain and neck stiffness. Skin:  Positive for color change. Negative for pallor, rash and wound. Allergic/Immunologic: Negative for environmental allergies and food allergies.    Neurological: Negative for dizziness, tremors, syncope, speech difficulty, weakness, light-headedness, numbness and headaches. Hematological:  Negative for adenopathy. Does not bruise/bleed easily. Psychiatric/Behavioral:  Negative for agitation, behavioral problems, confusion, decreased concentration, dysphoric mood, hallucinations, self-injury, sleep disturbance and suicidal ideas. The patient is not nervous/anxious and is not hyperactive. Prior to Visit Medications    Medication Sig Taking? Authorizing Provider   cephALEXin (KEFLEX) 500 MG capsule Take 1 capsule by mouth 4 times daily for 10 days Yes Deatra Carbine, APRN - CNP   levothyroxine (SYNTHROID) 50 MCG tablet TAKE 1 TABLET BY MOUTH DAILY FOR 5 DAYS OF THE WEEK Yes Hector Talavera MD   ferrous sulfate (IRON 325) 325 (65 Fe) MG tablet Take 1 tablet by mouth 2 times daily Yes Hector Talavera MD       No Known Allergies    OBJECTIVE:      BP Readings from Last 2 Encounters:   12/15/22 100/70   06/09/22 114/72       Wt Readings from Last 3 Encounters:   12/15/22 178 lb (80.7 kg)   06/09/22 157 lb 12.8 oz (71.6 kg)   04/08/22 158 lb 3.2 oz (71.8 kg)       Physical Exam  Constitutional:       Appearance: Normal appearance. HENT:      Right Ear: External ear normal.      Left Ear: External ear normal.   Pulmonary:      Effort: Pulmonary effort is normal.   Musculoskeletal:         General: Swelling and tenderness present. No deformity or signs of injury. Right hand: Normal.      Left hand: Swelling and tenderness present. Decreased range of motion. Hands:       Right lower leg: No edema. Left lower leg: No edema. Skin:     General: Skin is warm. Capillary Refill: Capillary refill takes less than 2 seconds. Coloration: Skin is not jaundiced or pale. Findings: Erythema present. No bruising, lesion or rash. Neurological:      General: No focal deficit present.       Mental Status: She is alert and oriented to person, place, and time. ASSESSMENT/PLAN:    1. Thumb pain, left    - cephALEXin (KEFLEX) 500 MG capsule; Take 1 capsule by mouth 4 times daily for 10 days  Dispense: 40 capsule; Refill: 0  - Uric Acid; Future    2. Joint swelling    - cephALEXin (KEFLEX) 500 MG capsule; Take 1 capsule by mouth 4 times daily for 10 days  Dispense: 40 capsule; Refill: 0  - Uric Acid; Future  - Educated on signs and symptoms of cellulitis spreading and when to go to the ER. 3. Redness of joint    - cephALEXin (KEFLEX) 500 MG capsule; Take 1 capsule by mouth 4 times daily for 10 days  Dispense: 40 capsule; Refill: 0  - Uric Acid; Future    Advised to start probiotic. DD gout vs cellulitis. Will follow up with labs.

## 2023-03-15 ENCOUNTER — OFFICE VISIT (OUTPATIENT)
Dept: FAMILY MEDICINE CLINIC | Age: 48
End: 2023-03-15
Payer: COMMERCIAL

## 2023-03-15 VITALS
DIASTOLIC BLOOD PRESSURE: 82 MMHG | HEART RATE: 80 BPM | HEIGHT: 64 IN | BODY MASS INDEX: 27.66 KG/M2 | WEIGHT: 162 LBS | SYSTOLIC BLOOD PRESSURE: 120 MMHG | OXYGEN SATURATION: 99 %

## 2023-03-15 DIAGNOSIS — B36.0 TINEA VERSICOLOR: Primary | ICD-10-CM

## 2023-03-15 DIAGNOSIS — H10.9 CONJUNCTIVITIS OF BOTH EYES, UNSPECIFIED CONJUNCTIVITIS TYPE: ICD-10-CM

## 2023-03-15 PROCEDURE — 99213 OFFICE O/P EST LOW 20 MIN: CPT | Performed by: FAMILY MEDICINE

## 2023-03-15 RX ORDER — OLOPATADINE HYDROCHLORIDE 1 MG/ML
1 SOLUTION/ DROPS OPHTHALMIC 2 TIMES DAILY
Qty: 1 EACH | Refills: 0 | Status: SHIPPED | OUTPATIENT
Start: 2023-03-15 | End: 2023-04-14

## 2023-03-15 SDOH — ECONOMIC STABILITY: HOUSING INSECURITY
IN THE LAST 12 MONTHS, WAS THERE A TIME WHEN YOU DID NOT HAVE A STEADY PLACE TO SLEEP OR SLEPT IN A SHELTER (INCLUDING NOW)?: NO

## 2023-03-15 SDOH — ECONOMIC STABILITY: INCOME INSECURITY: HOW HARD IS IT FOR YOU TO PAY FOR THE VERY BASICS LIKE FOOD, HOUSING, MEDICAL CARE, AND HEATING?: NOT HARD AT ALL

## 2023-03-15 SDOH — ECONOMIC STABILITY: FOOD INSECURITY: WITHIN THE PAST 12 MONTHS, THE FOOD YOU BOUGHT JUST DIDN'T LAST AND YOU DIDN'T HAVE MONEY TO GET MORE.: NEVER TRUE

## 2023-03-15 SDOH — ECONOMIC STABILITY: FOOD INSECURITY: WITHIN THE PAST 12 MONTHS, YOU WORRIED THAT YOUR FOOD WOULD RUN OUT BEFORE YOU GOT MONEY TO BUY MORE.: NEVER TRUE

## 2023-03-15 ASSESSMENT — PATIENT HEALTH QUESTIONNAIRE - PHQ9
1. LITTLE INTEREST OR PLEASURE IN DOING THINGS: 0
SUM OF ALL RESPONSES TO PHQ QUESTIONS 1-9: 0
SUM OF ALL RESPONSES TO PHQ QUESTIONS 1-9: 0
SUM OF ALL RESPONSES TO PHQ9 QUESTIONS 1 & 2: 0
SUM OF ALL RESPONSES TO PHQ QUESTIONS 1-9: 0
2. FEELING DOWN, DEPRESSED OR HOPELESS: 0
SUM OF ALL RESPONSES TO PHQ QUESTIONS 1-9: 0

## 2023-03-15 NOTE — PROGRESS NOTES
Nicki Desai (:  1975) is a 53 y.o. female,Established patient, here for evaluation of the following chief complaint(s): Other (Burning sensation on lips and corners of eyes. Fingernail is turning yellow. )         ASSESSMENT/PLAN:  Pauline was seen today for other. Diagnoses and all orders for this visit:    Tinea versicolor  Can use lotrimin around the face  Conjunctivitis of both eyes, unspecified conjunctivitis type  Suspect irritant vs allergic  Patanol drops and refresh lubricating drops during the day  Other orders  -     olopatadine (PATANOL) 0.1 % ophthalmic solution; Place 1 drop into both eyes 2 times daily       No follow-ups on file. Subjective   SUBJECTIVE/OBJECTIVE:  HPI  Pt is a of 53 y.o. female comes in today with   Chief Complaint   Patient presents with    Other     Burning sensation on lips and corners of eyes. Fingernail is turning yellow. Started with discoloration of fingernails. Improved with lotrimin  Past few days burning of eyes and lips. Doesn't note allergy symptoms. No relief with aquaphor. No diet changes. No itchy. Vitals:    03/15/23 1402   BP: 120/82   Site: Left Upper Arm   Position: Sitting   Cuff Size: Medium Adult   Pulse: 80   SpO2: 99%   Weight: 162 lb (73.5 kg)   Height: 5' 4\" (1.626 m)      Past Medical History:Reviewed  Medications:Reviewed. No Known Allergies   Social hx:Reviewed. Social History     Tobacco Use   Smoking Status Never   Smokeless Tobacco Never     Review of Systems       Objective   Physical Exam     Nad  Slight injunction of conjunctiva    An electronic signature was used to authenticate this note.     --Andres Ernst MD

## 2023-06-08 ENCOUNTER — TELEPHONE (OUTPATIENT)
Dept: ENDOCRINOLOGY | Age: 48
End: 2023-06-08

## 2023-06-08 DIAGNOSIS — E03.8 HYPOTHYROIDISM DUE TO HASHIMOTO'S THYROIDITIS: Primary | ICD-10-CM

## 2023-06-08 DIAGNOSIS — E55.9 VITAMIN D DEFICIENCY: ICD-10-CM

## 2023-06-08 DIAGNOSIS — E06.3 HYPOTHYROIDISM DUE TO HASHIMOTO'S THYROIDITIS: Primary | ICD-10-CM

## 2023-06-08 NOTE — TELEPHONE ENCOUNTER
Pt called in requesting new labs to be ordered that she can get done in the end of July before August appt.

## 2023-06-09 NOTE — TELEPHONE ENCOUNTER
Called to let Mrs. Melchor Juan know the lab orders have been placed. No answer and VM not setup. Will try again Monday.

## 2023-07-06 DIAGNOSIS — E06.3 HYPOTHYROIDISM DUE TO HASHIMOTO'S THYROIDITIS: ICD-10-CM

## 2023-07-06 DIAGNOSIS — E03.8 HYPOTHYROIDISM DUE TO HASHIMOTO'S THYROIDITIS: ICD-10-CM

## 2023-07-06 RX ORDER — LEVOTHYROXINE SODIUM 0.05 MG/1
TABLET ORAL
Qty: 30 TABLET | Refills: 0 | Status: SHIPPED | OUTPATIENT
Start: 2023-07-06

## 2023-07-06 NOTE — TELEPHONE ENCOUNTER
Patient needs refill    levothyroxine (SYNTHROID) 50 MCG tablet       Patient needs new pharmacy for all scripts    Nick Crimes , [address], [address] [facility]  Phone: [phone]

## 2023-07-06 NOTE — TELEPHONE ENCOUNTER
Called Virgie Liliya Rollins to offer appointment tomorrow as she is over due. She has already scheduled something and cant make it.   She is requesting 30 days supply until her follow up in August.

## 2023-07-24 DIAGNOSIS — E06.3 HYPOTHYROIDISM DUE TO HASHIMOTO'S THYROIDITIS: ICD-10-CM

## 2023-07-24 DIAGNOSIS — E55.9 VITAMIN D DEFICIENCY: ICD-10-CM

## 2023-07-24 DIAGNOSIS — E03.8 HYPOTHYROIDISM DUE TO HASHIMOTO'S THYROIDITIS: ICD-10-CM

## 2023-07-24 LAB
T4 FREE SERPL-MCNC: 1.2 NG/DL (ref 0.9–1.8)
TSH SERPL DL<=0.005 MIU/L-ACNC: 2.47 UIU/ML (ref 0.27–4.2)

## 2023-07-25 LAB — 25(OH)D3 SERPL-MCNC: 63.4 NG/ML

## 2023-07-27 ENCOUNTER — OFFICE VISIT (OUTPATIENT)
Dept: FAMILY MEDICINE CLINIC | Age: 48
End: 2023-07-27
Payer: COMMERCIAL

## 2023-07-27 ENCOUNTER — TELEPHONE (OUTPATIENT)
Dept: FAMILY MEDICINE CLINIC | Age: 48
End: 2023-07-27

## 2023-07-27 VITALS
BODY MASS INDEX: 27.52 KG/M2 | SYSTOLIC BLOOD PRESSURE: 106 MMHG | DIASTOLIC BLOOD PRESSURE: 68 MMHG | WEIGHT: 161.2 LBS | OXYGEN SATURATION: 98 % | HEIGHT: 64 IN | HEART RATE: 74 BPM

## 2023-07-27 DIAGNOSIS — N92.0 MENORRHAGIA WITH REGULAR CYCLE: ICD-10-CM

## 2023-07-27 DIAGNOSIS — Z00.00 WELL ADULT EXAM: Primary | ICD-10-CM

## 2023-07-27 DIAGNOSIS — Z12.11 SCREENING FOR COLON CANCER: ICD-10-CM

## 2023-07-27 DIAGNOSIS — R22.31 AXILLARY LUMP, RIGHT: ICD-10-CM

## 2023-07-27 DIAGNOSIS — E03.8 HYPOTHYROIDISM DUE TO HASHIMOTO'S THYROIDITIS: ICD-10-CM

## 2023-07-27 DIAGNOSIS — E06.3 HYPOTHYROIDISM DUE TO HASHIMOTO'S THYROIDITIS: ICD-10-CM

## 2023-07-27 DIAGNOSIS — L60.8 TOENAIL DEFORMITY: ICD-10-CM

## 2023-07-27 DIAGNOSIS — Z12.31 ENCOUNTER FOR SCREENING MAMMOGRAM FOR MALIGNANT NEOPLASM OF BREAST: ICD-10-CM

## 2023-07-27 PROCEDURE — 99396 PREV VISIT EST AGE 40-64: CPT | Performed by: NURSE PRACTITIONER

## 2023-07-27 NOTE — TELEPHONE ENCOUNTER
Yesenia from [facility] is calling in to have [address] to a R breast ultrasound instead of the soft tissue limited area. Please advise.

## 2023-07-27 NOTE — PROGRESS NOTES
Carmela Cabrera Principal [facility]  [address] [place] [address]  Phone: [phone]    2023    Toña Bullock (:  1975) is a 53 y.o. female, here for a preventive medicine evaluation. Patient Active Problem List   Diagnosis    Hypothyroidism    Vitamin D deficiency    Restless legs syndrome     Hypothyroidism  TSH ok on 23 blood work  On synthroid  Sees Dr. 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  Authorizing Provider   levothyroxine (SYNTHROID) 50 MCG tablet TAKE 1 TABLET BY MOUTH DAILY FOR 5 DAYS OF THE WEEK Yes Jairon Perez MD   ferrous sulfate (IRON 325) 325 (65 Fe) MG tablet Take 1 tablet by mouth 2 times daily Yes Jairon Perez MD        No Known Allergies    Past Medical History:   Diagnosis Date    Hypothyroidism        Past Surgical History:   Procedure Laterality Date    FOOT SURGERY Right     2002    FOOT SURGERY Left            Social History     Socioeconomic History    Marital status:      Spouse name: Not on file    Number of children: Not on file    Years of education: Not on file    Highest education level: Not on file   Occupational History    Not on file   Tobacco Use    Smoking status: Never    Smokeless tobacco: Never   Vaping Use    Vaping Use: Never used   Substance and Sexual Activity    Alcohol use: Never    Drug use: Never    Sexual activity: Not Currently   Other Topics Concern    Not on file   Social History Narrative    Not on

## 2023-08-02 ENCOUNTER — HOSPITAL ENCOUNTER (OUTPATIENT)
Dept: ULTRASOUND IMAGING | Age: 48
Discharge: HOME | End: 2023-08-02
Payer: COMMERCIAL

## 2023-08-02 ENCOUNTER — HOSPITAL ENCOUNTER (OUTPATIENT)
Dept: WOMENS IMAGING | Age: 48
Discharge: HOME | End: 2023-08-02
Payer: COMMERCIAL

## 2023-08-02 VITALS — BODY MASS INDEX: 27.49 KG/M2 | WEIGHT: 161 LBS | HEIGHT: 64 IN

## 2023-08-02 DIAGNOSIS — R22.31 AXILLARY LUMP, RIGHT: ICD-10-CM

## 2023-08-02 DIAGNOSIS — R92.8 ABNORMAL MAMMOGRAM: ICD-10-CM

## 2023-08-02 DIAGNOSIS — Z12.31 ENCOUNTER FOR SCREENING MAMMOGRAM FOR MALIGNANT NEOPLASM OF BREAST: ICD-10-CM

## 2023-08-02 PROCEDURE — 76882 US LMTD JT/FCL EVL NVASC XTR: CPT

## 2023-08-02 PROCEDURE — G0279 TOMOSYNTHESIS, MAMMO: HCPCS

## 2023-08-02 PROCEDURE — 76642 ULTRASOUND BREAST LIMITED: CPT

## 2023-08-03 ENCOUNTER — OFFICE VISIT (OUTPATIENT)
Dept: ENDOCRINOLOGY | Age: 48
End: 2023-08-03
Payer: COMMERCIAL

## 2023-08-03 VITALS
SYSTOLIC BLOOD PRESSURE: 116 MMHG | HEIGHT: 64 IN | BODY MASS INDEX: 27.31 KG/M2 | OXYGEN SATURATION: 99 % | WEIGHT: 160 LBS | HEART RATE: 58 BPM | DIASTOLIC BLOOD PRESSURE: 76 MMHG

## 2023-08-03 DIAGNOSIS — E03.8 HYPOTHYROIDISM DUE TO HASHIMOTO'S THYROIDITIS: Primary | ICD-10-CM

## 2023-08-03 DIAGNOSIS — E06.3 HYPOTHYROIDISM DUE TO HASHIMOTO'S THYROIDITIS: Primary | ICD-10-CM

## 2023-08-03 DIAGNOSIS — R73.01 IMPAIRED FASTING BLOOD SUGAR: ICD-10-CM

## 2023-08-03 DIAGNOSIS — E55.9 VITAMIN D DEFICIENCY: ICD-10-CM

## 2023-08-03 PROCEDURE — 99214 OFFICE O/P EST MOD 30 MIN: CPT | Performed by: INTERNAL MEDICINE

## 2023-08-03 RX ORDER — LEVOTHYROXINE SODIUM 0.05 MG/1
TABLET ORAL
Qty: 90 TABLET | Refills: 3 | Status: SHIPPED | OUTPATIENT
Start: 2023-08-03

## 2023-08-03 NOTE — PROGRESS NOTES
Patient ID: Luis Vizcaino is a 53 y.o. female    Chief Complaint: Hypothyroidism, Vit D Def     HPI:   Luis Vizcaino is here for an evaluation of hypothyroidism diagnosed in July 2019 when TSH was 8.93 in July 2019. TPO ab high July 2019     Family history of thyroid disorder: None   No neck radiation    Takes Levothyroxine 50 mcg, five days a week skipping Saturday and Sunday. In morning empty stomach with water and waits for 45 minutes. Energy levels are good   No changes in skin or hair   Weight is up 3 lbs. Walks 3 days a week, 30 minutes. No speicific heat or cold intolerance. Hot flashes getting worse. She has excessive sweating at night. Sleep is not good. Restless leg symptoms. Denies tremors, palpitations  No neck pain, compressive neck symptoms   Menstrual cycles are heavy and longer       No Recent iodine loading in form of contrast material for diagnostic studies/cardiac cath  Vit D drops,  MVT/ collagen daily.  No other food supplements, herbs or medications including Biotin  No Recent URTI    Vit D Def:   OTC D3 6000 units few days of the week     The following portions of the patient's history were reviewed and updated as appropriate:       Family History   Problem Relation Age of Onset    Cancer Mother     High Blood Pressure Father     Stroke Father     Uterine Cancer Maternal Aunt               Social History     Socioeconomic History    Marital status:      Spouse name: Not on file    Number of children: Not on file    Years of education: Not on file    Highest education level: Not on file   Occupational History    Not on file   Tobacco Use    Smoking status: Never    Smokeless tobacco: Never   Vaping Use    Vaping Use: Never used   Substance and Sexual Activity    Alcohol use: Never    Drug use: Never    Sexual activity: Not Currently   Other Topics Concern    Not on file   Social History Narrative    Not on file     Social Determinants of Health     Financial Resource Strain:

## 2023-08-05 DIAGNOSIS — R92.8 ABNORMAL MAMMOGRAM OF LEFT BREAST: Primary | ICD-10-CM

## 2023-08-11 ENCOUNTER — TELEPHONE (OUTPATIENT)
Dept: PRIMARY CARE CLINIC | Age: 48
End: 2023-08-11

## 2023-08-29 LAB — NONINV COLON CA DNA+OCC BLD SCRN STL QL: NEGATIVE

## 2023-10-03 DIAGNOSIS — E06.3 HYPOTHYROIDISM DUE TO HASHIMOTO'S THYROIDITIS: ICD-10-CM

## 2023-10-03 DIAGNOSIS — E03.8 HYPOTHYROIDISM DUE TO HASHIMOTO'S THYROIDITIS: ICD-10-CM

## 2023-10-03 RX ORDER — LEVOTHYROXINE SODIUM 0.05 MG/1
TABLET ORAL
Qty: 30 TABLET | Refills: 0 | OUTPATIENT
Start: 2023-10-03

## 2023-10-03 NOTE — TELEPHONE ENCOUNTER
Requested Refill:   Requested Prescriptions     Pending Prescriptions Disp Refills    levothyroxine (SYNTHROID) 50 MCG tablet [Pharmacy Med Name: Levothyroxine Sodium 50 MCG Oral Tablet] 30 tablet 0     Sig: TAKE 1 TABLET BY MOUTH ONCE DAILY FOR 5 DAYS OF THE WEEK.  NEEDS APPOINTMENT FOR MORE REFILLS       Refill requested too soon

## 2024-06-13 ENCOUNTER — OFFICE VISIT (OUTPATIENT)
Dept: FAMILY MEDICINE CLINIC | Age: 49
End: 2024-06-13
Payer: COMMERCIAL

## 2024-06-13 ENCOUNTER — TELEPHONE (OUTPATIENT)
Dept: FAMILY MEDICINE CLINIC | Age: 49
End: 2024-06-13

## 2024-06-13 VITALS
DIASTOLIC BLOOD PRESSURE: 64 MMHG | HEART RATE: 76 BPM | HEIGHT: 64 IN | OXYGEN SATURATION: 98 % | BODY MASS INDEX: 28.51 KG/M2 | SYSTOLIC BLOOD PRESSURE: 120 MMHG | WEIGHT: 167 LBS

## 2024-06-13 DIAGNOSIS — G44.311 INTRACTABLE ACUTE POST-TRAUMATIC HEADACHE: Primary | ICD-10-CM

## 2024-06-13 DIAGNOSIS — S09.8XXA OTHER SPECIFIED INJURIES OF HEAD, INITIAL ENCOUNTER: ICD-10-CM

## 2024-06-13 PROCEDURE — 99213 OFFICE O/P EST LOW 20 MIN: CPT | Performed by: NURSE PRACTITIONER

## 2024-06-13 SDOH — ECONOMIC STABILITY: INCOME INSECURITY: HOW HARD IS IT FOR YOU TO PAY FOR THE VERY BASICS LIKE FOOD, HOUSING, MEDICAL CARE, AND HEATING?: NOT HARD AT ALL

## 2024-06-13 SDOH — ECONOMIC STABILITY: FOOD INSECURITY: WITHIN THE PAST 12 MONTHS, YOU WORRIED THAT YOUR FOOD WOULD RUN OUT BEFORE YOU GOT MONEY TO BUY MORE.: NEVER TRUE

## 2024-06-13 SDOH — ECONOMIC STABILITY: FOOD INSECURITY: WITHIN THE PAST 12 MONTHS, THE FOOD YOU BOUGHT JUST DIDN'T LAST AND YOU DIDN'T HAVE MONEY TO GET MORE.: NEVER TRUE

## 2024-06-13 ASSESSMENT — PATIENT HEALTH QUESTIONNAIRE - PHQ9
SUM OF ALL RESPONSES TO PHQ9 QUESTIONS 1 & 2: 0
SUM OF ALL RESPONSES TO PHQ QUESTIONS 1-9: 0
1. LITTLE INTEREST OR PLEASURE IN DOING THINGS: NOT AT ALL
SUM OF ALL RESPONSES TO PHQ QUESTIONS 1-9: 0
2. FEELING DOWN, DEPRESSED OR HOPELESS: NOT AT ALL

## 2024-06-13 NOTE — PROGRESS NOTES
place, and time.      Cranial Nerves: No cranial nerve deficit.      Motor: No weakness.       An electronic signature was used to authenticate this note.    --MANJU Whittaker - CNP

## 2024-06-13 NOTE — TELEPHONE ENCOUNTER
Pt called stating her CT scan would be $1000.00 pt asking how necessary is the test or if it is precautionary.    Pt was informed that she should have CT done due to any bleeding. She was also informed to watch for any dizziness or extreme tiredness worsening HA.

## 2024-07-01 ENCOUNTER — TELEPHONE (OUTPATIENT)
Dept: FAMILY MEDICINE CLINIC | Age: 49
End: 2024-07-01

## 2024-07-01 NOTE — TELEPHONE ENCOUNTER
Elina with [employer] [phone] needs office notes faxed to [phone].  Pt is celio at [facility] 7/3 for CT head.

## 2024-07-25 ENCOUNTER — OFFICE VISIT (OUTPATIENT)
Dept: FAMILY MEDICINE CLINIC | Age: 49
End: 2024-07-25
Payer: COMMERCIAL

## 2024-07-25 VITALS
BODY MASS INDEX: 28.79 KG/M2 | HEIGHT: 64 IN | SYSTOLIC BLOOD PRESSURE: 98 MMHG | WEIGHT: 168.6 LBS | OXYGEN SATURATION: 97 % | DIASTOLIC BLOOD PRESSURE: 64 MMHG | HEART RATE: 64 BPM

## 2024-07-25 DIAGNOSIS — R23.3 EASY BRUISING: ICD-10-CM

## 2024-07-25 DIAGNOSIS — R25.3 MUSCLE TWITCHING: ICD-10-CM

## 2024-07-25 DIAGNOSIS — R63.5 WEIGHT GAIN: ICD-10-CM

## 2024-07-25 DIAGNOSIS — Z00.00 WELL ADULT EXAM: Primary | ICD-10-CM

## 2024-07-25 DIAGNOSIS — N92.0 MENORRHAGIA WITH REGULAR CYCLE: ICD-10-CM

## 2024-07-25 DIAGNOSIS — E06.3 HYPOTHYROIDISM DUE TO HASHIMOTO'S THYROIDITIS: ICD-10-CM

## 2024-07-25 DIAGNOSIS — R22.31 AXILLARY LUMP, RIGHT: ICD-10-CM

## 2024-07-25 DIAGNOSIS — E03.8 HYPOTHYROIDISM DUE TO HASHIMOTO'S THYROIDITIS: ICD-10-CM

## 2024-07-25 LAB — HBA1C MFR BLD: 5.3 %

## 2024-07-25 PROCEDURE — 99396 PREV VISIT EST AGE 40-64: CPT | Performed by: NURSE PRACTITIONER

## 2024-07-25 PROCEDURE — 83036 HEMOGLOBIN GLYCOSYLATED A1C: CPT | Performed by: NURSE PRACTITIONER

## 2024-07-25 NOTE — PROGRESS NOTES
normal.   Pulmonary:      Effort: Pulmonary effort is normal.      Breath sounds: Normal breath sounds and air entry.   Abdominal:      Palpations: Abdomen is soft.      Tenderness: There is no abdominal tenderness.   Neurological:      Mental Status: She is alert.      Cranial Nerves: No facial asymmetry.      Comments: Facial twitching with half smile; none with full smile   Psychiatric:         Mood and Affect: Mood normal.             Latest Ref Rng & Units 7/25/2024     8:38 AM 7/24/2023     1:53 PM 6/3/2022     7:41 AM   [facility]   A1C % 5.3      A1C POC % 5.3      TSH 0.27 - 4.20 uIU/mL  2.47  2.77        Lab Results   Component Value Date/Time    CHOL 219 04/14/2022 07:36 AM    CHOL 210 07/17/2019 10:04 AM    TRIG 53 04/14/2022 07:36 AM    TRIG 81 07/17/2019 10:04 AM    HDL 71 04/14/2022 07:36 AM    HDL 72 07/17/2019 10:04 AM    GLUCOSE 107 04/14/2022 07:36 AM    LABA1C 5.3 07/25/2024 08:38 AM       The 10-year ASCVD risk score (Jakub KEBEDE, et al., 2019) is: 0.5%    Values used to calculate the score:      Age: 49 years      Sex: Female      Is Non- : No      Diabetic: No      Tobacco smoker: No      Systolic Blood Pressure: 98 mmHg      Is BP treated: No      HDL Cholesterol: 71 mg/dL      Total Cholesterol: 219 mg/dL    Immunization History   Administered Date(s) Administered    COVID-19, PFIZER PURPLE top, DILUTE for use, (age 12 y+), 30mcg/0.3mL 04/22/2021, 05/13/2021, 12/29/2021    TDaP, ADACEL (age 10y-64y), BOOSTRIX (age 10y+), IM, 0.5mL 10/24/2019       Health Maintenance   Topic Date Due    Hepatitis B vaccine (1 of 3 - 3-dose series) Never done    HIV screen  Never done    Hepatitis C screen  Never done    Cervical cancer screen  03/01/2022    COVID-19 Vaccine (4 - 2023-24 season) 09/01/2023    Flu vaccine (1) 08/01/2024    Depression Screen  06/13/2025    Breast cancer screen  08/02/2025    Colorectal Cancer Screen  08/20/2026    Lipids  04/14/2027    Diabetes screen

## 2024-07-29 LAB
ALBUMIN: 4 G/DL
ALP BLD-CCNC: 36 U/L
ALT SERPL-CCNC: 12 U/L
ANION GAP SERPL CALCULATED.3IONS-SCNC: ABNORMAL MMOL/L
AST SERPL-CCNC: 18 U/L
BASOPHILS ABSOLUTE: 0 /ΜL
BASOPHILS RELATIVE PERCENT: 1 %
BILIRUB SERPL-MCNC: 0.5 MG/DL (ref 0.1–1.4)
BUN BLDV-MCNC: 12 MG/DL
CALCIUM SERPL-MCNC: 8.9 MG/DL
CHLORIDE BLD-SCNC: 105 MMOL/L
CHOLESTEROL, TOTAL: 198 MG/DL
CHOLESTEROL/HDL RATIO: 68
CO2: 20 MMOL/L
CREAT SERPL-MCNC: 0.83 MG/DL
EOSINOPHILS ABSOLUTE: 0.2 /ΜL
EOSINOPHILS RELATIVE PERCENT: 4 %
ESTIMATED AVERAGE GLUCOSE: NORMAL
GFR, ESTIMATED: 87
GLUCOSE BLD-MCNC: 96 MG/DL
HBA1C MFR BLD: 5.5 %
HCT VFR BLD CALC: 39.4 % (ref 36–46)
HDLC SERPL-MCNC: 68 MG/DL (ref 35–70)
HEMOGLOBIN: 12.6 G/DL (ref 12–16)
LDL CHOLESTEROL: 118
LYMPHOCYTES ABSOLUTE: 2.1 /ΜL
LYMPHOCYTES RELATIVE PERCENT: 38 %
MCH RBC QN AUTO: 27.6 PG
MCHC RBC AUTO-ENTMCNC: 32 G/DL
MCV RBC AUTO: 86 FL
MONOCYTES ABSOLUTE: 0.4 /ΜL
MONOCYTES RELATIVE PERCENT: 7 %
NEUTROPHILS ABSOLUTE: 2.8 /ΜL
NEUTROPHILS RELATIVE PERCENT: 50 %
NONHDLC SERPL-MCNC: NORMAL MG/DL
PDW BLD-RTO: 13.6 %
PLATELET # BLD: 279 K/ΜL
PMV BLD AUTO: NORMAL FL
POTASSIUM SERPL-SCNC: 4.1 MMOL/L
RBC # BLD: 4.56 10^6/ΜL
SODIUM BLD-SCNC: 137 MMOL/L
T4 FREE: 1.13
TOTAL PROTEIN: 6.1 G/DL (ref 6.4–8.2)
TRIGL SERPL-MCNC: 63 MG/DL
TSH SERPL DL<=0.05 MIU/L-ACNC: 4.65 UIU/ML
VITAMIN B-12: 387
VLDLC SERPL CALC-MCNC: 12 MG/DL
WBC # BLD: 5.5 10^3/ML

## 2024-07-30 DIAGNOSIS — R23.3 EASY BRUISING: ICD-10-CM

## 2024-07-30 DIAGNOSIS — Z00.00 WELL ADULT EXAM: ICD-10-CM

## 2024-07-30 DIAGNOSIS — E03.8 HYPOTHYROIDISM DUE TO HASHIMOTO'S THYROIDITIS: ICD-10-CM

## 2024-07-30 DIAGNOSIS — N92.0 MENORRHAGIA WITH REGULAR CYCLE: ICD-10-CM

## 2024-07-30 DIAGNOSIS — E06.3 HYPOTHYROIDISM DUE TO HASHIMOTO'S THYROIDITIS: ICD-10-CM

## 2024-07-30 DIAGNOSIS — R73.01 IMPAIRED FASTING BLOOD SUGAR: ICD-10-CM

## 2024-07-30 DIAGNOSIS — E55.9 VITAMIN D DEFICIENCY: ICD-10-CM

## 2024-07-30 DIAGNOSIS — R25.3 MUSCLE TWITCHING: ICD-10-CM

## 2024-08-01 NOTE — PROGRESS NOTES
Patient ID: Pauline Peoples is a 49 y.o. female    Chief Complaint: Hypothyroidism, Vit D Def     HPI:   Pauline Peoples is here for an evaluation of hypothyroidism diagnosed in July 2019 when TSH was 8.93 in July 2019. TPO ab high July 2019     Family history of thyroid disorder: None   No neck radiation    Takes Levothyroxine 50 mcg, five days a week skipping Saturday and Sunday.  In morning empty stomach with water and waits for 45 minutes.   She thinks she is missing some since Ramadan     Energy levels are good   No changes in skin or hair   Weight is up 6 lbs after gaining 3 lbs. Walks 4 days a week, 30 minutes.     She   No speicific heat or cold intolerance.  Hot flashes getting worse. She has excessive sweating at night.      Sleep is okay. Restless leg symptoms.    Denies tremors, palpitations  No neck pain, compressive neck symptoms   Menstrual cycles are light and longer     No Recent iodine loading in form of contrast material for diagnostic studies/cardiac cath  Vit D drops,  MVT/ collagen daily. No other food supplements, herbs or medications including Biotin  No Recent URTI    Vit D Def:   OTC D3 6,000 units few days of the week     The following portions of the patient's history were reviewed and updated as appropriate:       Family History   Problem Relation Age of Onset    Cancer Mother     High Blood Pressure Father     Stroke Father     Uterine Cancer Maternal Aunt               Social History     Socioeconomic History    Marital status:      Spouse name: Not on file    Number of children: Not on file    Years of education: Not on file    Highest education level: Not on file   Occupational History    Not on file   Tobacco Use    Smoking status: Never    Smokeless tobacco: Never   Vaping Use    Vaping Use: Never used   Substance and Sexual Activity    Alcohol use: Never    Drug use: Never    Sexual activity: Not Currently   Other Topics Concern    Not on file   Social History Narrative    Not on

## 2024-08-02 ENCOUNTER — OFFICE VISIT (OUTPATIENT)
Dept: ENDOCRINOLOGY | Age: 49
End: 2024-08-02
Payer: COMMERCIAL

## 2024-08-02 VITALS
WEIGHT: 168.2 LBS | HEIGHT: 64 IN | HEART RATE: 60 BPM | OXYGEN SATURATION: 98 % | DIASTOLIC BLOOD PRESSURE: 66 MMHG | SYSTOLIC BLOOD PRESSURE: 103 MMHG | BODY MASS INDEX: 28.71 KG/M2

## 2024-08-02 DIAGNOSIS — R73.01 IMPAIRED FASTING BLOOD SUGAR: ICD-10-CM

## 2024-08-02 DIAGNOSIS — E06.3 HYPOTHYROIDISM DUE TO HASHIMOTO'S THYROIDITIS: ICD-10-CM

## 2024-08-02 DIAGNOSIS — E03.8 HYPOTHYROIDISM DUE TO HASHIMOTO'S THYROIDITIS: ICD-10-CM

## 2024-08-02 DIAGNOSIS — E55.9 VITAMIN D DEFICIENCY: Primary | ICD-10-CM

## 2024-08-02 PROCEDURE — 99214 OFFICE O/P EST MOD 30 MIN: CPT | Performed by: INTERNAL MEDICINE

## 2024-08-02 RX ORDER — LEVOTHYROXINE SODIUM 0.05 MG/1
TABLET ORAL
Qty: 90 TABLET | Refills: 3 | Status: SHIPPED | OUTPATIENT
Start: 2024-08-02